# Patient Record
Sex: MALE | Race: BLACK OR AFRICAN AMERICAN | NOT HISPANIC OR LATINO | URBAN - METROPOLITAN AREA
[De-identification: names, ages, dates, MRNs, and addresses within clinical notes are randomized per-mention and may not be internally consistent; named-entity substitution may affect disease eponyms.]

---

## 2021-09-19 ENCOUNTER — EMERGENCY (EMERGENCY)
Facility: HOSPITAL | Age: 46
LOS: 0 days | Discharge: HOME | End: 2021-09-19
Attending: EMERGENCY MEDICINE
Payer: COMMERCIAL

## 2021-09-19 VITALS
HEART RATE: 88 BPM | OXYGEN SATURATION: 100 % | TEMPERATURE: 98 F | DIASTOLIC BLOOD PRESSURE: 73 MMHG | RESPIRATION RATE: 18 BRPM | SYSTOLIC BLOOD PRESSURE: 122 MMHG

## 2021-09-19 VITALS
OXYGEN SATURATION: 98 % | HEIGHT: 73 IN | SYSTOLIC BLOOD PRESSURE: 118 MMHG | DIASTOLIC BLOOD PRESSURE: 75 MMHG | WEIGHT: 246.04 LBS | TEMPERATURE: 98 F | HEART RATE: 94 BPM | RESPIRATION RATE: 19 BRPM

## 2021-09-19 DIAGNOSIS — H55.09 OTHER FORMS OF NYSTAGMUS: ICD-10-CM

## 2021-09-19 DIAGNOSIS — I10 ESSENTIAL (PRIMARY) HYPERTENSION: ICD-10-CM

## 2021-09-19 DIAGNOSIS — E11.9 TYPE 2 DIABETES MELLITUS WITHOUT COMPLICATIONS: ICD-10-CM

## 2021-09-19 DIAGNOSIS — R42 DIZZINESS AND GIDDINESS: ICD-10-CM

## 2021-09-19 LAB
BLOOD GAS SOURCE: SIGNIFICANT CHANGE UP
COHGB MFR BLDV: 1.3 % — SIGNIFICANT CHANGE UP (ref 0–2)
HGB BLD CALC-MCNC: 15.6 G/DL — SIGNIFICANT CHANGE UP (ref 13–17)

## 2021-09-19 PROCEDURE — 99285 EMERGENCY DEPT VISIT HI MDM: CPT

## 2021-09-19 PROCEDURE — 93010 ELECTROCARDIOGRAM REPORT: CPT

## 2021-09-19 PROCEDURE — 70450 CT HEAD/BRAIN W/O DYE: CPT | Mod: 26,MA

## 2021-09-19 RX ORDER — MECLIZINE HCL 12.5 MG
1 TABLET ORAL
Qty: 15 | Refills: 0
Start: 2021-09-19 | End: 2021-09-23

## 2021-09-19 RX ORDER — ACETAMINOPHEN 500 MG
975 TABLET ORAL ONCE
Refills: 0 | Status: COMPLETED | OUTPATIENT
Start: 2021-09-19 | End: 2021-09-19

## 2021-09-19 RX ORDER — MECLIZINE HCL 12.5 MG
50 TABLET ORAL ONCE
Refills: 0 | Status: COMPLETED | OUTPATIENT
Start: 2021-09-19 | End: 2021-09-19

## 2021-09-19 RX ADMIN — Medication 975 MILLIGRAM(S): at 22:18

## 2021-09-19 RX ADMIN — Medication 50 MILLIGRAM(S): at 19:42

## 2021-09-19 RX ADMIN — Medication 975 MILLIGRAM(S): at 23:20

## 2021-09-19 NOTE — ED PROVIDER NOTE - CARE PROVIDER_API CALL
Yasir Venegas)  Neurology  [address], [address]  [place] [address]  Phone: [phone]  Fax: [phone]  Follow Up Time:

## 2021-09-19 NOTE — ED PROVIDER NOTE - CLINICAL SUMMARY MEDICAL DECISION MAKING FREE TEXT BOX
DDx; BPPV, no evidence of central vertigo  Plan: CT head, meclozine, pt request carbon monoxide testing and reassess.

## 2021-09-19 NOTE — ED PROVIDER NOTE - NSFOLLOWUPINSTRUCTIONS_ED_ALL_ED_FT
1) Please follow-up with a neurologist about your symptoms.  If you cannot follow-up with your doctor(s), please return to the ED for any urgent issues.  2) If you have any worsening of symptoms or any other concerns please return to the ED immediately.  3) Please continue taking your home medications as directed.  4) You may have been given a copy of your labs and/or imaging.  Please go over these with your primary care doctor.   5) A prescription has been sent to your pharmacy. Please take it as directed.

## 2021-09-19 NOTE — ED ADULT NURSE NOTE - OBJECTIVE STATEMENT
Pt is a 45 yo M, AOx4 ambulatory hx of HTN, DM c/o dizziness and loss of balance x3 days. Pt denies any symptoms at this time. Allergy to shellfish.

## 2021-09-19 NOTE — ED PROVIDER NOTE - NEUROLOGICAL, MLM
Horizontal nystagmus to the right. Orange Hallpike positive. Epley improved symptoms. No dysmetria, normal finger to nose.

## 2021-09-19 NOTE — ED ADULT NURSE NOTE - SKIN TURGOR
COVID-19 PCR test completed. Patient handout For Patients Who Have Been Tested for Covid-19 (Coronavirus) was given to the patient, which includes test result notification process.    
resilient/elastic

## 2021-09-19 NOTE — ED PROVIDER NOTE - OBJECTIVE STATEMENT
Pt is a 47 yo male w/PMH of HTN and DM presents to the ED for evaluation of spinning sensation for the past x2 days. Sensation is worse w/ movement and better laying back. No headache, vomiting, ringing in ears, slurred speech, or focal weakness. No h/o of this in past.

## 2021-09-19 NOTE — ED PROVIDER NOTE - PATIENT PORTAL LINK FT
You can access the FollowMyHealth Patient Portal offered by [facility] by registering at the following website: [email or web address]. By joining [employer]’s FollowMyHealth portal, you will also be able to view your health information using other applications (apps) compatible with our system.

## 2021-12-27 ENCOUNTER — EMERGENCY (EMERGENCY)
Facility: HOSPITAL | Age: 46
LOS: 1 days | Discharge: HOME | End: 2021-12-27
Attending: EMERGENCY MEDICINE | Admitting: EMERGENCY MEDICINE
Payer: COMMERCIAL

## 2021-12-27 VITALS
RESPIRATION RATE: 18 BRPM | SYSTOLIC BLOOD PRESSURE: 135 MMHG | HEIGHT: 73 IN | DIASTOLIC BLOOD PRESSURE: 78 MMHG | OXYGEN SATURATION: 99 % | HEART RATE: 82 BPM | TEMPERATURE: 99 F

## 2021-12-27 VITALS
DIASTOLIC BLOOD PRESSURE: 65 MMHG | SYSTOLIC BLOOD PRESSURE: 116 MMHG | HEART RATE: 80 BPM | RESPIRATION RATE: 18 BRPM | TEMPERATURE: 99 F | OXYGEN SATURATION: 98 %

## 2021-12-27 LAB
ALBUMIN SERPL ELPH-MCNC: 4.4 G/DL — SIGNIFICANT CHANGE UP (ref 3.3–5)
ALP SERPL-CCNC: 48 U/L — SIGNIFICANT CHANGE UP (ref 40–120)
ALT FLD-CCNC: 36 U/L — SIGNIFICANT CHANGE UP (ref 4–41)
ANION GAP SERPL CALC-SCNC: 7 MMOL/L — SIGNIFICANT CHANGE UP (ref 7–14)
APPEARANCE UR: CLEAR — SIGNIFICANT CHANGE UP
AST SERPL-CCNC: 22 U/L — SIGNIFICANT CHANGE UP (ref 4–40)
BASOPHILS # BLD AUTO: 0.02 K/UL — SIGNIFICANT CHANGE UP (ref 0–0.2)
BASOPHILS NFR BLD AUTO: 0.2 % — SIGNIFICANT CHANGE UP (ref 0–2)
BILIRUB SERPL-MCNC: 0.9 MG/DL — SIGNIFICANT CHANGE UP (ref 0.2–1.2)
BILIRUB UR-MCNC: NEGATIVE — SIGNIFICANT CHANGE UP
BUN SERPL-MCNC: 15 MG/DL — SIGNIFICANT CHANGE UP (ref 7–23)
CALCIUM SERPL-MCNC: 9.3 MG/DL — SIGNIFICANT CHANGE UP (ref 8.4–10.5)
CHLORIDE SERPL-SCNC: 101 MMOL/L — SIGNIFICANT CHANGE UP (ref 98–107)
CO2 SERPL-SCNC: 29 MMOL/L — SIGNIFICANT CHANGE UP (ref 22–31)
COLOR SPEC: YELLOW — SIGNIFICANT CHANGE UP
CREAT SERPL-MCNC: 0.86 MG/DL — SIGNIFICANT CHANGE UP (ref 0.5–1.3)
DIFF PNL FLD: NEGATIVE — SIGNIFICANT CHANGE UP
EOSINOPHIL # BLD AUTO: 0.08 K/UL — SIGNIFICANT CHANGE UP (ref 0–0.5)
EOSINOPHIL NFR BLD AUTO: 1 % — SIGNIFICANT CHANGE UP (ref 0–6)
GLUCOSE SERPL-MCNC: 101 MG/DL — HIGH (ref 70–99)
GLUCOSE UR QL: NEGATIVE — SIGNIFICANT CHANGE UP
HCT VFR BLD CALC: 44.3 % — SIGNIFICANT CHANGE UP (ref 39–50)
HGB BLD-MCNC: 14.7 G/DL — SIGNIFICANT CHANGE UP (ref 13–17)
IANC: 4.23 K/UL — SIGNIFICANT CHANGE UP (ref 1.5–8.5)
IMM GRANULOCYTES NFR BLD AUTO: 0.2 % — SIGNIFICANT CHANGE UP (ref 0–1.5)
KETONES UR-MCNC: NEGATIVE — SIGNIFICANT CHANGE UP
LEUKOCYTE ESTERASE UR-ACNC: NEGATIVE — SIGNIFICANT CHANGE UP
LYMPHOCYTES # BLD AUTO: 3.39 K/UL — HIGH (ref 1–3.3)
LYMPHOCYTES # BLD AUTO: 40.4 % — SIGNIFICANT CHANGE UP (ref 13–44)
MCHC RBC-ENTMCNC: 29 PG — SIGNIFICANT CHANGE UP (ref 27–34)
MCHC RBC-ENTMCNC: 33.2 GM/DL — SIGNIFICANT CHANGE UP (ref 32–36)
MCV RBC AUTO: 87.4 FL — SIGNIFICANT CHANGE UP (ref 80–100)
MONOCYTES # BLD AUTO: 0.65 K/UL — SIGNIFICANT CHANGE UP (ref 0–0.9)
MONOCYTES NFR BLD AUTO: 7.7 % — SIGNIFICANT CHANGE UP (ref 2–14)
NEUTROPHILS # BLD AUTO: 4.23 K/UL — SIGNIFICANT CHANGE UP (ref 1.8–7.4)
NEUTROPHILS NFR BLD AUTO: 50.5 % — SIGNIFICANT CHANGE UP (ref 43–77)
NITRITE UR-MCNC: NEGATIVE — SIGNIFICANT CHANGE UP
NRBC # BLD: 0 /100 WBCS — SIGNIFICANT CHANGE UP
NRBC # FLD: 0 K/UL — SIGNIFICANT CHANGE UP
PH UR: 6 — SIGNIFICANT CHANGE UP (ref 5–8)
PLATELET # BLD AUTO: 236 K/UL — SIGNIFICANT CHANGE UP (ref 150–400)
POTASSIUM SERPL-MCNC: 4.1 MMOL/L — SIGNIFICANT CHANGE UP (ref 3.5–5.3)
POTASSIUM SERPL-SCNC: 4.1 MMOL/L — SIGNIFICANT CHANGE UP (ref 3.5–5.3)
PROT SERPL-MCNC: 7.8 G/DL — SIGNIFICANT CHANGE UP (ref 6–8.3)
PROT UR-MCNC: ABNORMAL
RBC # BLD: 5.07 M/UL — SIGNIFICANT CHANGE UP (ref 4.2–5.8)
RBC # FLD: 11.9 % — SIGNIFICANT CHANGE UP (ref 10.3–14.5)
SODIUM SERPL-SCNC: 137 MMOL/L — SIGNIFICANT CHANGE UP (ref 135–145)
SP GR SPEC: 1.03 — SIGNIFICANT CHANGE UP (ref 1–1.05)
UROBILINOGEN FLD QL: ABNORMAL
WBC # BLD: 8.39 K/UL — SIGNIFICANT CHANGE UP (ref 3.8–10.5)
WBC # FLD AUTO: 8.39 K/UL — SIGNIFICANT CHANGE UP (ref 3.8–10.5)

## 2021-12-27 PROCEDURE — 76770 US EXAM ABDO BACK WALL COMP: CPT | Mod: 26

## 2021-12-27 PROCEDURE — 99285 EMERGENCY DEPT VISIT HI MDM: CPT

## 2021-12-27 RX ORDER — KETOROLAC TROMETHAMINE 30 MG/ML
15 SYRINGE (ML) INJECTION ONCE
Refills: 0 | Status: DISCONTINUED | OUTPATIENT
Start: 2021-12-27 | End: 2021-12-27

## 2021-12-27 RX ADMIN — Medication 15 MILLIGRAM(S): at 21:07

## 2021-12-27 NOTE — ED ADULT NURSE NOTE - OBJECTIVE STATEMENT
received pt in intake room 3, 47 yr/o male A+Ox4 ambulatory at baseline. PMH of DM (metformin) and HTN who p/w right flank pain x 3 days, foul smelling and dark urine.  Also with chills, nasal congestion, sore throat and mild cough all starting today. VSS, denies chest pain and SOB, RR even and unlabored. no travel, no known sick contacts. denies abdominal pain N/V/D/C. right AC 20g placed, labs drawn and sent. will continue to monitor.

## 2021-12-27 NOTE — ED PROVIDER NOTE - PATIENT PORTAL LINK FT
You can access the FollowMyHealth Patient Portal offered by [facility] by registering at the following website: [email or web address]. By joining [employer]’s FollowMyHealth portal, you will also be able to view your health information using other applications (apps) compatible with our system.

## 2021-12-27 NOTE — ED PROVIDER NOTE - OBJECTIVE STATEMENT
46 yr old M c ho DM (metformin) and HTN who p/w right flank pain x 3 days, foul smelling and dark urine.  Also with chills, nasal congestion, sore throat and mild cough all starting today. NO travel, No known sick contacts.  Has not been working lately d/t suspension. Lives alone. No dysuria. Flank pain is localized, constant.  No other MSK pain.

## 2021-12-27 NOTE — ED ADULT NURSE NOTE - CHIEF COMPLAINT QUOTE
Pt c/o R sided flank pain, headache, congestion, subjective fevers x 3 days. Also reports foul-smelling urine. Reports taking Ibuprofen prior to arrival. Denies nausea/vomiting, dysuria. PMHx DM, HTN

## 2021-12-27 NOTE — ED PROVIDER NOTE - NSFOLLOWUPINSTRUCTIONS_ED_ALL_ED_FT
Your symptoms are probably being caused by a viral syndrome.  This is most likely COVID-19 (coronavirus).  Please take Tylenol 650 mg every 6 hours as needed for fever or pain and make sure to drink plenty of fluids and to eat as tolerated. You may also take Ibuprofen 400 mg every 6 hours if needed. It is ok to eat less than usual as long as you are drinking.  Your COVID result will be emailed to you, and you may access it on the Patient Portal (instructions included in this packet).     Return to the ER for worsening shortness of breath, uncontrolled fevers, or uncontrolled pain.     Please follow the self quarantine instructions in the packet provided.  You should wear a mask when around other people and and wash your hands frequently.  If you live with others, they should also wear masks when they are around you.  Clean commonly touched surfaces (doorknobs, cabinets, etc) frequently with disinfectant .  Make every effort to avoid people with weakened immune systems since they can get very sick from the Coronavirus.     Call your primary care doctor for follow up in 4-5 days, or earlier if needed.

## 2021-12-27 NOTE — ED PROVIDER NOTE - CLINICAL SUMMARY MEDICAL DECISION MAKING FREE TEXT BOX
Middle aged M c DM and HTN who p/w 3 days of flank pain, also dark urine.  No other urinary sx though. Also with sore throat and mild cough starting today.  Suspect viral syndrome.  No CVAT but will assess for UTI/pyelo, r/o hydronephrosis.  Swab for COVID.

## 2021-12-27 NOTE — ED ADULT TRIAGE NOTE - CHIEF COMPLAINT QUOTE
Pt c/o R sided flank pain, headache, congestion, subjective fevers x 3 days. Also reports foul-smelling urine. Reports taking Ibuprofen prior to arrival. Denies nausea/vomiting. PMHx DM, HTN Pt c/o R sided flank pain, headache, congestion, subjective fevers x 3 days. Also reports foul-smelling urine. Reports taking Ibuprofen prior to arrival. Denies nausea/vomiting, dysuria. PMHx DM, HTN

## 2021-12-28 PROBLEM — E11.9 TYPE 2 DIABETES MELLITUS WITHOUT COMPLICATIONS: Chronic | Status: ACTIVE | Noted: 2021-09-22

## 2021-12-28 PROBLEM — I10 ESSENTIAL (PRIMARY) HYPERTENSION: Chronic | Status: ACTIVE | Noted: 2021-09-22

## 2021-12-28 LAB — SARS-COV-2 RNA SPEC QL NAA+PROBE: DETECTED

## 2021-12-29 LAB
CULTURE RESULTS: SIGNIFICANT CHANGE UP
SPECIMEN SOURCE: SIGNIFICANT CHANGE UP

## 2022-04-11 NOTE — ED ADULT TRIAGE NOTE - BEFAST SCREENING
Relevant Problems   RESPIRATORY SYSTEM   (+) NADEEM (obstructive sleep apnea)      NEUROLOGY   (+) Migraine      CARDIOVASCULAR   (+) Essential hypertension       Anesthetic History   No history of anesthetic complications            Review of Systems / Medical History  Patient summary reviewed and pertinent labs reviewed    Pulmonary        Sleep apnea: CPAP           Neuro/Psych         Headaches (migraines)     Cardiovascular    Hypertension: well controlled              Exercise tolerance: >4 METS     GI/Hepatic/Renal     GERD: well controlled           Endo/Other        Morbid obesity and anemia (Hb 10.5)     Other Findings              Physical Exam    Airway  Mallampati: III  TM Distance: 4 - 6 cm  Neck ROM: normal range of motion   Mouth opening: Normal     Cardiovascular  Regular rate and rhythm,  S1 and S2 normal,  no murmur, click, rub, or gallop             Dental         Pulmonary  Breath sounds clear to auscultation               Abdominal         Other Findings            Anesthetic Plan    ASA: 3  Anesthesia type: general          Induction: Intravenous  Anesthetic plan and risks discussed with: Patient and Spouse Negative

## 2022-07-25 ENCOUNTER — EMERGENCY (EMERGENCY)
Facility: HOSPITAL | Age: 47
LOS: 0 days | Discharge: HOME | End: 2022-07-26
Attending: EMERGENCY MEDICINE

## 2022-07-25 VITALS
DIASTOLIC BLOOD PRESSURE: 78 MMHG | RESPIRATION RATE: 17 BRPM | WEIGHT: 220.02 LBS | SYSTOLIC BLOOD PRESSURE: 120 MMHG | HEART RATE: 79 BPM | HEIGHT: 73 IN | OXYGEN SATURATION: 100 % | TEMPERATURE: 98 F

## 2022-07-25 DIAGNOSIS — Z79.84 LONG TERM (CURRENT) USE OF ORAL HYPOGLYCEMIC DRUGS: ICD-10-CM

## 2022-07-25 DIAGNOSIS — R56.9 UNSPECIFIED CONVULSIONS: ICD-10-CM

## 2022-07-25 DIAGNOSIS — I10 ESSENTIAL (PRIMARY) HYPERTENSION: ICD-10-CM

## 2022-07-25 DIAGNOSIS — R41.0 DISORIENTATION, UNSPECIFIED: ICD-10-CM

## 2022-07-25 DIAGNOSIS — Z91.013 ALLERGY TO SEAFOOD: ICD-10-CM

## 2022-07-25 DIAGNOSIS — R74.02 ELEVATION OF LEVELS OF LACTIC ACID DEHYDROGENASE [LDH]: ICD-10-CM

## 2022-07-25 DIAGNOSIS — E11.9 TYPE 2 DIABETES MELLITUS WITHOUT COMPLICATIONS: ICD-10-CM

## 2022-07-25 LAB — GLUCOSE BLDC GLUCOMTR-MCNC: 126 MG/DL — HIGH (ref 70–99)

## 2022-07-25 PROCEDURE — 93010 ELECTROCARDIOGRAM REPORT: CPT

## 2022-07-25 PROCEDURE — 99053 MED SERV 10PM-8AM 24 HR FAC: CPT

## 2022-07-25 PROCEDURE — 99285 EMERGENCY DEPT VISIT HI MDM: CPT

## 2022-07-25 RX ORDER — SODIUM CHLORIDE 9 MG/ML
1000 INJECTION INTRAMUSCULAR; INTRAVENOUS; SUBCUTANEOUS ONCE
Refills: 0 | Status: COMPLETED | OUTPATIENT
Start: 2022-07-25 | End: 2022-07-25

## 2022-07-25 NOTE — ED PROVIDER NOTE - OBJECTIVE STATEMENT
46 y/o M with PMHx of HTN and DM presents to the ED for seizures. Pt's family reported that pt had LOC. Pt drank some vodka but he doesn't drink every day. Pt has no hx of withdrawal. Apparently, he called his family that he doesn't feel good and when his cousin arrived he found pt had a blank stare. EMS was called and in rout to the hospital, pt had similar episodes. No seizure activity is noted. Pt is confused as he was before. Pt denies CP, abd pain, hx of seizures, drug use. Pt wishes to leave at this point.

## 2022-07-25 NOTE — ED PROVIDER NOTE - PATIENT PORTAL LINK FT
You can access the FollowMyHealth Patient Portal offered by [facility] by registering at the following website: [email or web address]. By joining "[employer]"’s FollowMyHealth portal, you will also be able to view your health information using other applications (apps) compatible with our system.

## 2022-07-25 NOTE — ED PROVIDER NOTE - PROGRESS NOTE DETAILS
Pt found to have elevated lactate, possibly consistent with new seizure. CT head unremarkable, troponin negative. Pt declines to stay for repeat lactate, and patient is with wife and who is willing to care for him. Return precautions provided.

## 2022-07-25 NOTE — ED PROVIDER NOTE - CLINICAL SUMMARY MEDICAL DECISION MAKING FREE TEXT BOX
DDx: alcohol intoxication/syncope/presentation not typical for seizures, no tongue bite, no loss of urinary contents.    Plan: CT head, CBC, CMP, troponin, lactate, CXR, alcohol levels, reassess.

## 2022-07-25 NOTE — ED PROVIDER NOTE - CPE EDP MUSC NORM
normal... Area L Indication Text: Tumors in this location are included in Area L (trunk and extremities).  Mohs surgery is indicated for larger tumors, or tumors with aggressive histologic features, in these anatomic locations.

## 2022-07-26 LAB
ALBUMIN SERPL ELPH-MCNC: 3.3 G/DL — SIGNIFICANT CHANGE UP (ref 3.3–5)
ALP SERPL-CCNC: 50 U/L — SIGNIFICANT CHANGE UP (ref 40–120)
ALT FLD-CCNC: 49 U/L — SIGNIFICANT CHANGE UP (ref 12–78)
AMPHET UR-MCNC: NEGATIVE — SIGNIFICANT CHANGE UP
ANION GAP SERPL CALC-SCNC: 8 MMOL/L — SIGNIFICANT CHANGE UP (ref 5–17)
APPEARANCE UR: CLEAR — SIGNIFICANT CHANGE UP
AST SERPL-CCNC: 31 U/L — SIGNIFICANT CHANGE UP (ref 15–37)
BARBITURATES UR SCN-MCNC: NEGATIVE — SIGNIFICANT CHANGE UP
BASOPHILS # BLD AUTO: 0.03 K/UL — SIGNIFICANT CHANGE UP (ref 0–0.2)
BASOPHILS NFR BLD AUTO: 0.3 % — SIGNIFICANT CHANGE UP (ref 0–2)
BENZODIAZ UR-MCNC: NEGATIVE — SIGNIFICANT CHANGE UP
BILIRUB SERPL-MCNC: 0.4 MG/DL — SIGNIFICANT CHANGE UP (ref 0.2–1.2)
BILIRUB UR-MCNC: NEGATIVE — SIGNIFICANT CHANGE UP
BUN SERPL-MCNC: 15 MG/DL — SIGNIFICANT CHANGE UP (ref 7–23)
CALCIUM SERPL-MCNC: 8.7 MG/DL — SIGNIFICANT CHANGE UP (ref 8.5–10.1)
CHLORIDE SERPL-SCNC: 108 MMOL/L — SIGNIFICANT CHANGE UP (ref 96–108)
CO2 SERPL-SCNC: 24 MMOL/L — SIGNIFICANT CHANGE UP (ref 22–31)
COCAINE METAB.OTHER UR-MCNC: NEGATIVE — SIGNIFICANT CHANGE UP
COLOR SPEC: YELLOW — SIGNIFICANT CHANGE UP
CREAT SERPL-MCNC: 0.86 MG/DL — SIGNIFICANT CHANGE UP (ref 0.5–1.3)
DIFF PNL FLD: NEGATIVE — SIGNIFICANT CHANGE UP
EGFR: 107 ML/MIN/1.73M2 — SIGNIFICANT CHANGE UP
EOSINOPHIL # BLD AUTO: 0.08 K/UL — SIGNIFICANT CHANGE UP (ref 0–0.5)
EOSINOPHIL NFR BLD AUTO: 0.7 % — SIGNIFICANT CHANGE UP (ref 0–6)
ETHANOL SERPL-MCNC: 165 MG/DL — HIGH (ref 0–10)
GLUCOSE SERPL-MCNC: 120 MG/DL — HIGH (ref 70–99)
GLUCOSE UR QL: NEGATIVE MG/DL — SIGNIFICANT CHANGE UP
HCT VFR BLD CALC: 41.2 % — SIGNIFICANT CHANGE UP (ref 39–50)
HGB BLD-MCNC: 13.9 G/DL — SIGNIFICANT CHANGE UP (ref 13–17)
IMM GRANULOCYTES NFR BLD AUTO: 0.4 % — SIGNIFICANT CHANGE UP (ref 0–1.5)
KETONES UR-MCNC: NEGATIVE — SIGNIFICANT CHANGE UP
LACTATE SERPL-SCNC: 2.6 MMOL/L — HIGH (ref 0.7–2)
LEUKOCYTE ESTERASE UR-ACNC: NEGATIVE — SIGNIFICANT CHANGE UP
LYMPHOCYTES # BLD AUTO: 2.76 K/UL — SIGNIFICANT CHANGE UP (ref 1–3.3)
LYMPHOCYTES # BLD AUTO: 24.3 % — SIGNIFICANT CHANGE UP (ref 13–44)
MCHC RBC-ENTMCNC: 29.8 PG — SIGNIFICANT CHANGE UP (ref 27–34)
MCHC RBC-ENTMCNC: 33.7 G/DL — SIGNIFICANT CHANGE UP (ref 32–36)
MCV RBC AUTO: 88.2 FL — SIGNIFICANT CHANGE UP (ref 80–100)
METHADONE UR-MCNC: NEGATIVE — SIGNIFICANT CHANGE UP
MONOCYTES # BLD AUTO: 0.71 K/UL — SIGNIFICANT CHANGE UP (ref 0–0.9)
MONOCYTES NFR BLD AUTO: 6.3 % — SIGNIFICANT CHANGE UP (ref 2–14)
NEUTROPHILS # BLD AUTO: 7.72 K/UL — HIGH (ref 1.8–7.4)
NEUTROPHILS NFR BLD AUTO: 68 % — SIGNIFICANT CHANGE UP (ref 43–77)
NITRITE UR-MCNC: NEGATIVE — SIGNIFICANT CHANGE UP
NRBC # BLD: 0 /100 WBCS — SIGNIFICANT CHANGE UP (ref 0–0)
NT-PROBNP SERPL-SCNC: 16 PG/ML — SIGNIFICANT CHANGE UP (ref 0–125)
OPIATES UR-MCNC: NEGATIVE — SIGNIFICANT CHANGE UP
PCP SPEC-MCNC: SIGNIFICANT CHANGE UP
PCP UR-MCNC: NEGATIVE — SIGNIFICANT CHANGE UP
PH UR: 5 — SIGNIFICANT CHANGE UP (ref 5–8)
PLATELET # BLD AUTO: 269 K/UL — SIGNIFICANT CHANGE UP (ref 150–400)
POTASSIUM SERPL-MCNC: 4.3 MMOL/L — SIGNIFICANT CHANGE UP (ref 3.5–5.3)
POTASSIUM SERPL-SCNC: 4.3 MMOL/L — SIGNIFICANT CHANGE UP (ref 3.5–5.3)
PROT SERPL-MCNC: 7.5 GM/DL — SIGNIFICANT CHANGE UP (ref 6–8.3)
PROT UR-MCNC: NEGATIVE MG/DL — SIGNIFICANT CHANGE UP
RBC # BLD: 4.67 M/UL — SIGNIFICANT CHANGE UP (ref 4.2–5.8)
RBC # FLD: 12.2 % — SIGNIFICANT CHANGE UP (ref 10.3–14.5)
SODIUM SERPL-SCNC: 140 MMOL/L — SIGNIFICANT CHANGE UP (ref 135–145)
SP GR SPEC: 1.02 — SIGNIFICANT CHANGE UP (ref 1.01–1.02)
THC UR QL: NEGATIVE — SIGNIFICANT CHANGE UP
TROPONIN I, HIGH SENSITIVITY RESULT: 5.3 NG/L — SIGNIFICANT CHANGE UP
UROBILINOGEN FLD QL: NEGATIVE MG/DL — SIGNIFICANT CHANGE UP
WBC # BLD: 11.34 K/UL — HIGH (ref 3.8–10.5)
WBC # FLD AUTO: 11.34 K/UL — HIGH (ref 3.8–10.5)

## 2022-07-26 PROCEDURE — 70450 CT HEAD/BRAIN W/O DYE: CPT | Mod: 26,MA

## 2022-07-26 RX ORDER — SODIUM CHLORIDE 9 MG/ML
1000 INJECTION INTRAMUSCULAR; INTRAVENOUS; SUBCUTANEOUS ONCE
Refills: 0 | Status: COMPLETED | OUTPATIENT
Start: 2022-07-26 | End: 2022-07-26

## 2022-07-26 RX ORDER — METFORMIN HYDROCHLORIDE 850 MG/1
0 TABLET ORAL
Qty: 0 | Refills: 0 | DISCHARGE

## 2022-07-26 RX ORDER — LISINOPRIL 2.5 MG/1
1 TABLET ORAL
Qty: 0 | Refills: 0 | DISCHARGE

## 2022-07-26 RX ADMIN — SODIUM CHLORIDE 1000 MILLILITER(S): 9 INJECTION INTRAMUSCULAR; INTRAVENOUS; SUBCUTANEOUS at 01:34

## 2022-07-26 RX ADMIN — SODIUM CHLORIDE 1000 MILLILITER(S): 9 INJECTION INTRAMUSCULAR; INTRAVENOUS; SUBCUTANEOUS at 00:04

## 2022-07-26 NOTE — ED ADULT NURSE NOTE - OBJECTIVE STATEMENT
Patient A& o x3 BIBA for seizures.  Pt says he drank today- about 3 cups of vodka. Pt says he called his family that he didn't feel well. Pt's cousins said he had a blank stare and EMS said patient had a similar episode on the way to the hospital. PT is awake and laughing and wants to leave. Pt denies cp, no sob. pt has hx of dm, htn.

## 2022-07-26 NOTE — ED ADULT NURSE NOTE - NSIMPLEMENTINTERV_GEN_ALL_ED
Implemented All Fall Risk Interventions:  [place] to call system. Call bell, personal items and telephone within reach. Instruct patient to call for assistance. Room bathroom lighting operational. Non-slip footwear when patient is off stretcher. Physically safe environment: no spills, clutter or unnecessary equipment. Stretcher in lowest position, wheels locked, appropriate side rails in place. Provide visual cue, wrist band, yellow gown, etc. Monitor gait and stability. Monitor for mental status changes and reorient to person, place, and time. Review medications for side effects contributing to fall risk. Reinforce activity limits and safety measures with patient and family.

## 2022-07-26 NOTE — ED ADULT NURSE REASSESSMENT NOTE - NS ED NURSE REASSESS COMMENT FT1
Patient refused repeat lactate. Patient left without discharge papers or repeat vitals. IV removed. Dr Scout shah. Patient A& o x3 refused repeat lactate. Patient left without discharge papers or repeat vitals. IV removed. Dr Scout shah.

## 2022-07-27 LAB
CULTURE RESULTS: SIGNIFICANT CHANGE UP
SPECIMEN SOURCE: SIGNIFICANT CHANGE UP

## 2024-05-15 NOTE — ED ADULT TRIAGE NOTE - NSTRIAGECARE_GEN_A_ER
AMBULATORY CASE MANAGEMENT NOTE    Names and Relationships of Patient/Support Persons: Caller: Mary Albarran; Relationship: Self -     Patient Outreach  Called and spoke to patient, provided RN role and basis of CCM program. Patient stated she is unsure if she wants to be part of program, is driving home and agreeable to be called again later this week to discuss.     Patient had recent hospitalization to [facility] for nausea and vomiting, anemia and acute on chronic CHF from May 6-8.     RN will follow up.     Care Coordination  Notified TCM of patient dc on May 8 from [facility]. Per Carmela, notification should take place within 2 days of patient discharge to be called by TCM team. Patient outside of 2 day window.    RN discussed current status with Brittni. She is unsure if patient will be interested in program at this time, RN educated on TCM calls. Brittni verbalized agreement.      Education Documentation  No documentation found.        Safia KYLE  Ambulatory Case Management    5/15/2024, 12:21 EDT  
Patient has history of pauses occurring during sleep hours, episode likely occurring 0531 given time change. Will continue to monitor.       
Face Mask

## 2024-06-26 NOTE — ED PROVIDER NOTE - CROS ED NEURO POS
HPI:     37y G_P_  Last Menstrual Period   presents with   No CP, no SOB no difficulty breathing  Bleeding started at 8 AM  Took vaginal cyto at 9 PM last night  Last ate cereal at 10 AM    PMHX: none, no asthma, no HTN  PSHX; plates in foot, C/S x 4, D and C x 3  POBHX; c/section x 4 no complications postpartum  endometritis x 1 resolved wtih po abx, SAB, TOP x 3 (mTOP x 1, D and C x 2)  PGYNHX: hx of abnl pap, normal colpos, no fibroids, no cysts, no STDs  SOCIAL: smokes cigarettes a couple a day, no drinking, no illicit drugs.  Allergies    No Known Allergies    Intolerances      MEDS:      Vital Signs Last 24 Hrs  T(C): 37.3 (26 Jun 2024 12:28), Max: 37.3 (26 Jun 2024 12:28)  T(F): 99.1 (26 Jun 2024 12:28), Max: 99.1 (26 Jun 2024 12:28)  HR: 115 (26 Jun 2024 12:28) (115 - 115)  BP: 98/65 (26 Jun 2024 12:28) (98/65 - 98/65)  BP(mean): 76 (26 Jun 2024 12:28) (76 - 76)  RR: 18 (26 Jun 2024 12:28) (18 - 18)  SpO2: 99% (26 Jun 2024 12:28) (99% - 99%)    Parameters below as of 26 Jun 2024 12:28  Patient On (Oxygen Delivery Method): room air         PHYSICAL EXAM:  CHEST/LUNG: normal effort, ambulating without effort    ABDOMEN: Soft, Nontender, Nondistended; Bowel sounds present  EXTREMITIES:  2+ Peripheral Pulses, No clubbing, cyanosis, or edema  PELVIC:        EXTERNAL GENITALIA: Normal. No rashes or lesions noted.         VAGINA: healthy pink mucosa, no gross lesions, no discharge noted, no blood noted.          CERVIX: no lesions. closed, no active bleeding through os. no CMt noted.        UTERUS: normal size, nontender, mobile        ADNEXA: no adnexal masses or tenderness appreciated.    LABS:                RADIOLOGY STUDIES:     HPI:     37y   Last Menstrual Period 24 presents from the [facility] office for evaluation of heavy vaginal bleeding. She took cytotec vaginally last night and then started bleeding at around 8am this morning. She is passing clots and having BRB from the vagina.  No CP, no SOB no difficulty breathing. No dizziness   Last ate cereal at 10 AM    PMHX: none, no asthma, no HTN  PSHX; plates in foot, C/S x 4, D and C x 3, tendon sx  POBHX; c/section x 4 no complications. postpartum  endometritis x 1 resolved wtih po abx, SAB managed with D&C, TOP x 3 (mTOP x 1, D&C x 2)  PGYNHX: hx of abnl pap, normal colpos, no fibroids, no cysts, no STDs  SOCIAL: smokes cigarettes a couple a day, no drinking, no illicit drugs.  Allergies  No Known Allergies  MEDS: denies       Vital Signs Last 24 Hrs  T(C): 37.3 (2024 12:28), Max: 37.3 (2024 12:28)  T(F): 99.1 (2024 12:28), Max: 99.1 (2024 12:28)  HR: 115 (2024 12:28) (115 - 115)  BP: 98/65 (2024 12:28) (98/65 - 98/65)  BP(mean): 76 (2024 12:28) (76 - 76)  RR: 18 (2024 12:28) (18 - 18)  SpO2: 99% (2024 12:28) (99% - 99%)    Parameters below as of 2024 12:28  Patient On (Oxygen Delivery Method): room air         PHYSICAL EXAM:  GEN: patient appears well  CHEST/LUNG: normal effort, ambulating without effort  ABDOMEN: Soft, Nontender, Nondistended  EXTREMITIES:  2+ Peripheral Pulses, No clubbing, cyanosis, or edema  PELVIC:        EXTERNAL GENITALIA: Normal. No rashes or lesions noted.         VAGINA: healthy pink mucosa, no gross lesions, no discharge noted, significant clots noted in the vagina         CERVIX: no lesions. closed, small amount of active bleeding through os. no CMT noted.        UTERUS: normal size, ntender, mobile        ADNEXA: no adnexal masses or tenderness appreciated.    LABS:                          11.8   12.30 )-----------( 272      ( 2024 12:55 )             35.8         136  |  109<H>  |  8   ----------------------------<  110<H>  4.3   |  23  |  0.65    Ca    9.1      2024 12:55    TPro  7.3  /  Alb  3.8  /  TBili  0.6  /  DBili  x   /  AST  13<L>  /  ALT  14  /  AlkPhos  43        RADIOLOGY STUDIES:     HPI:     37y   Last Menstrual Period 24 presents [facility] the [facility] office for evaluation of heavy vaginal bleeding. She took cytotec vaginally last night and then started bleeding at around 8am this morning. She is passing clots and having BRB from the vagina.  No CP, no SOB no difficulty breathing. No dizziness   Last ate cereal at 10 AM    PMHX: none, no asthma, no HTN  PSHX; plates in foot, C/S x 4, D and C x 3, tendon sx  POBHX; c/section x 4 no complications. postpartum  endometritis x 1 resolved wtih po abx, SAB managed with D&C, TOP x 3 (mTOP x 1, D&C x 2)  PGYNHX: hx of abnl pap, normal colpos, no fibroids, no cysts, no STDs  SOCIAL: smokes cigarettes a couple a day, no drinking, no illicit drugs.  Allergies  No Known Allergies  MEDS: denies       Vital Signs Last 24 Hrs  T(C): 37.3 (2024 12:28), Max: 37.3 (2024 12:28)  T(F): 99.1 (2024 12:28), Max: 99.1 (2024 12:28)  HR: 115 (2024 12:28) (115 - 115)  BP: 98/65 (2024 12:28) (98/65 - 98/65)  BP(mean): 76 (2024 12:28) (76 - 76)  RR: 18 (2024 12:28) (18 - 18)  SpO2: 99% (2024 12:28) (99% - 99%)    Parameters below as of 2024 12:28  Patient On (Oxygen Delivery Method): room air    Blood type RH positive.       PHYSICAL EXAM:  GEN: patient appears well  CHEST/LUNG: normal effort, ambulating without effort  ABDOMEN: Soft, Nontender, Nondistended  PELVIC:        EXTERNAL GENITALIA: Normal. No rashes or lesions noted.         VAGINA: healthy pink mucosa, no gross lesions, no discharge noted, significant clots noted in the vagina about 100 cc         CERVIX: no lesions. closed, small amount of active bleeding through os. no CMT noted.        UTERUS: normal size, ntender, mobile        ADNEXA: no adnexal masses or tenderness appreciated.    LABS:                          11.8   12.30 )-----------( 272      ( 2024 12:55 )             35.8         136  |  109<H>  |  8   ----------------------------<  110<H>  4.3   |  23  |  0.65    Ca    9.1      2024 12:55    TPro  7.3  /  Alb  3.8  /  TBili  0.6  /  DBili  x   /  AST  13<L>  /  ALT  14  /  AlkPhos  43      RH positive      RADIOLOGY STUDIES:  < from: US Transvaginal, OB (24 @ 14:07) >  PROCEDURE DATE:  2024          INTERPRETATION:  CLINICAL INFORMATION: Heavy vaginal bleeding. SAB at 10   weeks status post misoprostol administration yesterday.    LMP: 2024    Estimated Gestational Age by LMP: 10 weeks 5 days    COMPARISON: None available.    Endovaginal and transabdominal pelvic sonogram.    FINDINGS:  Uterus: 10 x 5.9 x 6.1 cm. Evidence of an irregular complex cystic focus   in the lower uterus and a simple appearing fluid collection in the upper   uterus. The findings are consistent with retained conceptus.    Gestational Sac Size (mean): 11.2 mm  Gestational Sac Shape : Irregular  Crown Rump Length: No fetal pole.      Right ovary: 1.4 cm x 2.1 cm x 2.2 cm. Within normal limits.  Left ovary: 1.6 cm x 1.4 cm x 1.3 cm. Within normal limits.    Fluid: None.    IMPRESSION:    Complex and simple fluid collections within the uterus concerning for   retained conceptus.      --- End of Report ---            ROXANNA MONTGOMERY MD; Attending Radiologist  This document has been electronically signed. 2024  2:47PM    < end of copied text >     HPI:     37y   Last Menstrual Period 24 presents from the [facility] office for evaluation of heavy vaginal bleeding. She took cytotec vaginally last night and then started bleeding at around 8am this morning. She is passing clots and having BRB from the vagina. Pt denies any pelvic cramping or pain.  No CP, no SOB no difficulty breathing. No dizziness   Last ate cereal at 10 AM    PMHX: none, no asthma, no HTN  PSHX; plates in foot, C/S x 4, D and C x 3, tendon sx  POBHX; c/section x 4 no complications. postpartum  endometritis x 1 resolved wtih po abx, SAB managed with D&C, TOP x 3 (mTOP x 1, D&C x 2)  PGYNHX: hx of abnl pap, normal colpos, no fibroids, no cysts, no STDs  SOCIAL: smokes cigarettes a couple a day, no drinking, no illicit drugs.  Allergies  No Known Allergies  MEDS: denies       Vital Signs Last 24 Hrs  T(C): 37.3 (2024 12:28), Max: 37.3 (2024 12:28)  T(F): 99.1 (2024 12:28), Max: 99.1 (2024 12:28)  HR: 115 (2024 12:28) (115 - 115)  BP: 98/65 (2024 12:28) (98/65 - 98/65)  BP(mean): 76 (2024 12:28) (76 - 76)  RR: 18 (2024 12:28) (18 - 18)  SpO2: 99% (2024 12:28) (99% - 99%)    Parameters below as of 2024 12:28  Patient On (Oxygen Delivery Method): room air    Blood type RH positive.       PHYSICAL EXAM:  GEN: patient appears well  CHEST/LUNG: normal effort, ambulating without effort  ABDOMEN: Soft, Nontender, Nondistended  PELVIC:        EXTERNAL GENITALIA: Normal. No rashes or lesions noted.         VAGINA: healthy pink mucosa, no gross lesions, no discharge noted, significant clots noted in the vagina about 100 cc         CERVIX: no lesions. closed, small amount of active bleeding through os. no CMT noted.        UTERUS: normal size, nontender, mobile        ADNEXA: no adnexal masses or tenderness appreciated.    LABS:                          11.8   12.30 )-----------( 272      ( 2024 12:55 )             35.8         136  |  109<H>  |  8   ----------------------------<  110<H>  4.3   |  23  |  0.65    Ca    9.1      2024 12:55    TPro  7.3  /  Alb  3.8  /  TBili  0.6  /  DBili  x   /  AST  13<L>  /  ALT  14  /  AlkPhos  43      RH positive      RADIOLOGY STUDIES:  < from: US Transvaginal, OB (24 @ 14:07) >  PROCEDURE DATE:  2024          INTERPRETATION:  CLINICAL INFORMATION: Heavy vaginal bleeding. SAB at 10   weeks status post misoprostol administration yesterday.    LMP: 2024    Estimated Gestational Age by LMP: 10 weeks 5 days    COMPARISON: None available.    Endovaginal and transabdominal pelvic sonogram.    FINDINGS:  Uterus: 10 x 5.9 x 6.1 cm. Evidence of an irregular complex cystic focus   in the lower uterus and a simple appearing fluid collection in the upper   uterus. The findings are consistent with retained conceptus.    Gestational Sac Size (mean): 11.2 mm  Gestational Sac Shape : Irregular  Crown Rump Length: No fetal pole.      Right ovary: 1.4 cm x 2.1 cm x 2.2 cm. Within normal limits.  Left ovary: 1.6 cm x 1.4 cm x 1.3 cm. Within normal limits.    Fluid: None.    IMPRESSION:    Complex and simple fluid collections within the uterus concerning for   retained conceptus.      --- End of Report ---            ROXANNA MONTGOMERY MD; Attending Radiologist  This document has been electronically signed. 2024  2:47PM    < end of copied text >   SEIZURES

## 2024-11-15 NOTE — ED ADULT TRIAGE NOTE - WEIGHT IN LBS
220
Detail Level: Detailed
General Sunscreen Counseling: I recommended a broad spectrum sunscreen with a SPF of 30 or higher.  I explained that SPF 30 sunscreens block approximately 97 percent of the sun's harmful rays.  Sunscreens should be applied at least 15 minutes prior to expected sun exposure and then every 2 hours after that as long as sun exposure continues. If swimming or exercising sunscreen should be reapplied every 45 minutes to an hour after getting wet or sweating.  One ounce, or the equivalent of a shot glass full of sunscreen, is adequate to protect the skin not covered by a bathing suit. I also recommended a lip balm with a sunscreen as well. Sun protective clothing can be used in lieu of sunscreen but must be worn the entire time you are exposed to the sun's rays.

## 2025-01-14 ENCOUNTER — APPOINTMENT (OUTPATIENT)
Dept: UROLOGY | Facility: CLINIC | Age: 50
End: 2025-01-14

## 2025-04-22 NOTE — ED PROVIDER NOTE - DISPOSITION TYPE
"Daily Note     Today's date: 2025  Patient name: Osmin Hook  : 1966  MRN: [id number]  Referring provider: Kirsten Rodrigues PA-C  Dx:   Encounter Diagnosis     ICD-10-CM    1. S/P arthroscopy of right shoulder  Z98.890         Start Time: 1025  Stop Time: 1100  Total time in clinic (min): 35 minutes    Subjective: Pt reports no changes since last visit. Pt reports that he felt increase soreness following last session since he was off for a week.       Objective: See treatment diary below      Assessment: Tolerated treatment well. No changes since previous visit. Symptom presentation remains the same w/ no changes in PROM or AROM. Patient demonstrated fatigue post treatment, exhibited good technique with therapeutic exercises, and would benefit from continued PT for increased ROM, increased strength, and decreased symptom irritability.       Plan: Continue per plan of care.      Precautions: HTN, hx of lumbar fusion, Lyme's   POC expires Unit limit Auth Expiration date PT/OT/ST + Visit Limit?   25 BOMN N/A BOMN                           Visit/Unit Tracking  AUTH Status:  Date 3/4 3/6 3/18 3/20 3/25 3/27 4/1 4/3 4/15 4/17 4/22   N/A Used 7 8 9 10 11 12 13 14 15 16 17    Remaining                 PROM of shoulder to empty end feel with intermittently applied grade II mobilizations in   the open packed position of the shoulder. Maximum PROM is at week 6:   o 140° forward flexion   o 90° abduction  o 45° external rotation   o 35° internal rotation   Scaption pulleys at 10 minute maximum with 2 minute rest provided at 5 minutes   Pendulum, elbow AAROM and progressive gripping exercises     HEP: [id number]   Manuals 3/4 3/6 3/18 3/20 3/25 3/27 4/1 4/3 4/8 4/15 4/17 4/22                                                               Neuro Re-Ed               Repeated cervical retraction x30              Repeated cervical retraction w/ extension x30  x30 x30           Scap isometric   3x10 5\" 3x10 5\" 3x10 " "5\" 3x10 5\" 3x10 5\"        Prone Y,T,I        3x10 3\" ea 3x10 3\" ea 3x10 3\" ea 3x10 3\" ea 3x10 3\" ea                                                Ther Ex               Education                R shoulder PROM JMK JMK JMK JMK JMK JMK JMK JMK JMK HA JMK JMK   Scaption pulley's 5' x2 w/ rest 5' x2 w/ rest 5' x2 w/ rest 5' x2 w/ rest 5' x2 w/ rest 5' x2 w/ rest 5' x2 w/ rest 5' x2 w/ rest 5' x2 w/ rest 5'x2 w/ rest 5'x2 w/ rest 5'x2 w/ rest   Pendulums                AAROM flex, abd, ER X30 ea X30 ea X30 ea X30 ea X30 ea X30 ea X30 ea        Prone flexion w/ dowel        3x10 3x10 3# 3x10 3# 3x10 3# 3x10 3#                                                Ther Activity                                             Gait Training                                             Modalities                                                    " DISCHARGE

## 2025-07-10 ENCOUNTER — INPATIENT (INPATIENT)
Facility: HOSPITAL | Age: 50
LOS: 3 days | Discharge: HOME | End: 2025-07-14
Attending: INTERNAL MEDICINE | Admitting: INTERNAL MEDICINE
Payer: COMMERCIAL

## 2025-07-10 VITALS
DIASTOLIC BLOOD PRESSURE: 66 MMHG | OXYGEN SATURATION: 95 % | SYSTOLIC BLOOD PRESSURE: 114 MMHG | TEMPERATURE: 99 F | HEART RATE: 115 BPM | WEIGHT: 259.93 LBS | HEIGHT: 73 IN | RESPIRATION RATE: 18 BRPM

## 2025-07-10 LAB
ALBUMIN SERPL ELPH-MCNC: 3.4 G/DL — SIGNIFICANT CHANGE UP (ref 3.3–5)
ALP SERPL-CCNC: 52 U/L — SIGNIFICANT CHANGE UP (ref 40–120)
ALT FLD-CCNC: 66 U/L — SIGNIFICANT CHANGE UP (ref 12–78)
ANION GAP SERPL CALC-SCNC: 9 MMOL/L — SIGNIFICANT CHANGE UP (ref 5–17)
ANISOCYTOSIS BLD QL: SIGNIFICANT CHANGE UP
APPEARANCE UR: ABNORMAL
AST SERPL-CCNC: 41 U/L — HIGH (ref 15–37)
BACTERIA # UR AUTO: ABNORMAL /HPF
BASOPHILS # BLD AUTO: 0 K/UL — SIGNIFICANT CHANGE UP (ref 0–0.2)
BASOPHILS NFR BLD AUTO: 0 % — SIGNIFICANT CHANGE UP (ref 0–2)
BILIRUB SERPL-MCNC: 1.7 MG/DL — HIGH (ref 0.2–1.2)
BILIRUB UR-MCNC: ABNORMAL
BUN SERPL-MCNC: 21 MG/DL — SIGNIFICANT CHANGE UP (ref 7–23)
CALCIUM SERPL-MCNC: 9.9 MG/DL — SIGNIFICANT CHANGE UP (ref 8.5–10.1)
CHLORIDE SERPL-SCNC: 97 MMOL/L — SIGNIFICANT CHANGE UP (ref 96–108)
CO2 SERPL-SCNC: 27 MMOL/L — SIGNIFICANT CHANGE UP (ref 22–31)
COLOR SPEC: ABNORMAL
CREAT SERPL-MCNC: 2.13 MG/DL — HIGH (ref 0.5–1.3)
DIFF PNL FLD: ABNORMAL
EGFR: 37 ML/MIN/1.73M2 — LOW
EGFR: 37 ML/MIN/1.73M2 — LOW
EOSINOPHIL # BLD AUTO: 0 K/UL — SIGNIFICANT CHANGE UP (ref 0–0.5)
EOSINOPHIL NFR BLD AUTO: 0 % — SIGNIFICANT CHANGE UP (ref 0–6)
EPI CELLS # UR: PRESENT
FLUAV AG NPH QL: SIGNIFICANT CHANGE UP
FLUBV AG NPH QL: SIGNIFICANT CHANGE UP
GLUCOSE SERPL-MCNC: 158 MG/DL — HIGH (ref 70–99)
GLUCOSE UR QL: NEGATIVE MG/DL — SIGNIFICANT CHANGE UP
HCT VFR BLD CALC: 47.7 % — SIGNIFICANT CHANGE UP (ref 39–50)
HGB BLD-MCNC: 16.3 G/DL — SIGNIFICANT CHANGE UP (ref 13–17)
KETONES UR QL: ABNORMAL MG/DL
LACTATE SERPL-SCNC: 1.9 MMOL/L — SIGNIFICANT CHANGE UP (ref 0.7–2)
LEUKOCYTE ESTERASE UR-ACNC: ABNORMAL
LYMPHOCYTES # BLD AUTO: 1.68 K/UL — SIGNIFICANT CHANGE UP (ref 1–3.3)
LYMPHOCYTES # BLD AUTO: 9 % — LOW (ref 13–44)
MACROCYTES BLD QL: SIGNIFICANT CHANGE UP
MANUAL SMEAR VERIFICATION: SIGNIFICANT CHANGE UP
MCHC RBC-ENTMCNC: 29.7 PG — SIGNIFICANT CHANGE UP (ref 27–34)
MCHC RBC-ENTMCNC: 34.2 G/DL — SIGNIFICANT CHANGE UP (ref 32–36)
MCV RBC AUTO: 86.9 FL — SIGNIFICANT CHANGE UP (ref 80–100)
MONOCYTES # BLD AUTO: 1.68 K/UL — HIGH (ref 0–0.9)
MONOCYTES NFR BLD AUTO: 9 % — SIGNIFICANT CHANGE UP (ref 2–14)
NEUTROPHILS # BLD AUTO: 13.67 K/UL — HIGH (ref 1.8–7.4)
NEUTROPHILS NFR BLD AUTO: 71 % — SIGNIFICANT CHANGE UP (ref 43–77)
NEUTS BAND # BLD: 2 % — SIGNIFICANT CHANGE UP (ref 0–8)
NEUTS BAND NFR BLD: 2 % — SIGNIFICANT CHANGE UP (ref 0–8)
NITRITE UR-MCNC: POSITIVE
NRBC # BLD: 0 /100 WBCS — SIGNIFICANT CHANGE UP (ref 0–0)
NRBC BLD AUTO-RTO: SIGNIFICANT CHANGE UP /100 WBCS (ref 0–0)
NRBC BLD-RTO: 0 /100 WBCS — SIGNIFICANT CHANGE UP (ref 0–0)
OVALOCYTES BLD QL SMEAR: SLIGHT — SIGNIFICANT CHANGE UP
PH UR: 5 — SIGNIFICANT CHANGE UP (ref 5–8)
PLAT MORPH BLD: NORMAL — SIGNIFICANT CHANGE UP
PLATELET # BLD AUTO: 211 K/UL — SIGNIFICANT CHANGE UP (ref 150–400)
PLATELET COUNT - ESTIMATE: NORMAL — SIGNIFICANT CHANGE UP
POIKILOCYTOSIS BLD QL AUTO: SLIGHT — SIGNIFICANT CHANGE UP
POTASSIUM SERPL-MCNC: 4.4 MMOL/L — SIGNIFICANT CHANGE UP (ref 3.5–5.3)
POTASSIUM SERPL-SCNC: 4.4 MMOL/L — SIGNIFICANT CHANGE UP (ref 3.5–5.3)
PROT SERPL-MCNC: 8.9 GM/DL — HIGH (ref 6–8.3)
PROT UR-MCNC: 100 MG/DL
RBC # BLD: 5.49 M/UL — SIGNIFICANT CHANGE UP (ref 4.2–5.8)
RBC # FLD: 12.6 % — SIGNIFICANT CHANGE UP (ref 10.3–14.5)
RBC BLD AUTO: SIGNIFICANT CHANGE UP
RBC CASTS # UR COMP ASSIST: 5 /HPF — HIGH (ref 0–4)
RSV RNA NPH QL NAA+NON-PROBE: SIGNIFICANT CHANGE UP
SARS-COV-2 RNA SPEC QL NAA+PROBE: SIGNIFICANT CHANGE UP
SODIUM SERPL-SCNC: 133 MMOL/L — LOW (ref 135–145)
SOURCE RESPIRATORY: SIGNIFICANT CHANGE UP
SP GR SPEC: 1.03 — SIGNIFICANT CHANGE UP (ref 1–1.03)
SPHEROCYTES BLD QL SMEAR: SLIGHT — SIGNIFICANT CHANGE UP
UROBILINOGEN FLD QL: 2 MG/DL (ref 0.2–1)
VARIANT LYMPHS # BLD: 9 % — HIGH (ref 0–6)
VARIANT LYMPHS NFR BLD MANUAL: 9 % — HIGH (ref 0–6)
WBC # BLD: 18.72 K/UL — HIGH (ref 3.8–10.5)
WBC # FLD AUTO: 18.72 K/UL — HIGH (ref 3.8–10.5)
WBC UR QL: 8 /HPF — HIGH (ref 0–5)

## 2025-07-10 PROCEDURE — 99285 EMERGENCY DEPT VISIT HI MDM: CPT

## 2025-07-10 PROCEDURE — 71250 CT THORAX DX C-: CPT | Mod: 26

## 2025-07-10 PROCEDURE — 99223 1ST HOSP IP/OBS HIGH 75: CPT

## 2025-07-10 PROCEDURE — 74176 CT ABD & PELVIS W/O CONTRAST: CPT | Mod: 26

## 2025-07-10 PROCEDURE — 71045 X-RAY EXAM CHEST 1 VIEW: CPT | Mod: 26

## 2025-07-10 RX ORDER — CEFTRIAXONE 500 MG/1
1000 INJECTION, POWDER, FOR SOLUTION INTRAMUSCULAR; INTRAVENOUS ONCE
Refills: 0 | Status: COMPLETED | OUTPATIENT
Start: 2025-07-10 | End: 2025-07-10

## 2025-07-10 RX ORDER — DEXTROSE 50 % IN WATER 50 %
15 SYRINGE (ML) INTRAVENOUS ONCE
Refills: 0 | Status: DISCONTINUED | OUTPATIENT
Start: 2025-07-10 | End: 2025-07-14

## 2025-07-10 RX ORDER — ACETAMINOPHEN 500 MG/5ML
650 LIQUID (ML) ORAL EVERY 6 HOURS
Refills: 0 | Status: DISCONTINUED | OUTPATIENT
Start: 2025-07-10 | End: 2025-07-14

## 2025-07-10 RX ORDER — MELATONIN 5 MG
3 TABLET ORAL AT BEDTIME
Refills: 0 | Status: DISCONTINUED | OUTPATIENT
Start: 2025-07-10 | End: 2025-07-14

## 2025-07-10 RX ORDER — INSULIN LISPRO 100 U/ML
INJECTION, SOLUTION INTRAVENOUS; SUBCUTANEOUS
Refills: 0 | Status: DISCONTINUED | OUTPATIENT
Start: 2025-07-10 | End: 2025-07-14

## 2025-07-10 RX ORDER — DEXTROSE 50 % IN WATER 50 %
25 SYRINGE (ML) INTRAVENOUS ONCE
Refills: 0 | Status: DISCONTINUED | OUTPATIENT
Start: 2025-07-10 | End: 2025-07-14

## 2025-07-10 RX ORDER — ONDANSETRON HCL/PF 4 MG/2 ML
4 VIAL (ML) INJECTION EVERY 8 HOURS
Refills: 0 | Status: DISCONTINUED | OUTPATIENT
Start: 2025-07-10 | End: 2025-07-14

## 2025-07-10 RX ORDER — ACETAMINOPHEN 500 MG/5ML
1000 LIQUID (ML) ORAL ONCE
Refills: 0 | Status: COMPLETED | OUTPATIENT
Start: 2025-07-10 | End: 2025-07-10

## 2025-07-10 RX ORDER — MAGNESIUM, ALUMINUM HYDROXIDE 200-200 MG
30 TABLET,CHEWABLE ORAL EVERY 4 HOURS
Refills: 0 | Status: DISCONTINUED | OUTPATIENT
Start: 2025-07-10 | End: 2025-07-14

## 2025-07-10 RX ORDER — CEFTRIAXONE 500 MG/1
1000 INJECTION, POWDER, FOR SOLUTION INTRAMUSCULAR; INTRAVENOUS EVERY 24 HOURS
Refills: 0 | Status: DISCONTINUED | OUTPATIENT
Start: 2025-07-11 | End: 2025-07-14

## 2025-07-10 RX ORDER — GLUCAGON 3 MG/1
1 POWDER NASAL ONCE
Refills: 0 | Status: DISCONTINUED | OUTPATIENT
Start: 2025-07-10 | End: 2025-07-14

## 2025-07-10 RX ORDER — SODIUM CHLORIDE 9 G/1000ML
1000 INJECTION, SOLUTION INTRAVENOUS
Refills: 0 | Status: DISCONTINUED | OUTPATIENT
Start: 2025-07-10 | End: 2025-07-14

## 2025-07-10 RX ORDER — CEFTRIAXONE 500 MG/1
1000 INJECTION, POWDER, FOR SOLUTION INTRAMUSCULAR; INTRAVENOUS ONCE
Refills: 0 | Status: DISCONTINUED | OUTPATIENT
Start: 2025-07-10 | End: 2025-07-10

## 2025-07-10 RX ADMIN — Medication 1000 MILLIGRAM(S): at 20:32

## 2025-07-10 RX ADMIN — Medication 400 MILLIGRAM(S): at 18:57

## 2025-07-10 RX ADMIN — Medication 1000 MILLILITER(S): at 17:09

## 2025-07-10 RX ADMIN — Medication 1500 MILLILITER(S): at 19:28

## 2025-07-10 RX ADMIN — CEFTRIAXONE 1000 MILLIGRAM(S): 500 INJECTION, POWDER, FOR SOLUTION INTRAMUSCULAR; INTRAVENOUS at 18:57

## 2025-07-10 NOTE — ED ADULT NURSE NOTE - ED STAT RN HANDOFF DETAILS
report given to miquel go. pt resting in stretcher on RA. NADN att. aaox4, safety measures in place. IV patent and intact.

## 2025-07-10 NOTE — H&P ADULT - ASSESSMENT
50-year-old male with past medical history of diabetes & hypertension presents here today with fever at home, admitted for acute pyelonephritis.       #Pyelonehritis      #DM    Chronic medical conditions:  HTN:  50-year-old male with past medical history of diabetes & hypertension presents here today with fever at home, admitted for acute pyelonephritis.       ED course: Patient febrile with Tmax in .4, tachycardic, otherwise hemodynamically stable. Labs remarkable for WBC 18.72, Cr 2.13, T bili 1.7, UA showing cloudy urine/orange/100 protein/trace ketone/small blood/small bilirubin/2.0 urobilinogen/small LE/+ nitrite/8 WBC/5 RBC/few bacteria/+ sq epith. CT chest/abdomen/pelvis shows no acute findings, hepatic steatosis.     #Sepsis 2/2 Pyelonephritis  Patient presents with abdominal pain, flank pain, and fever. SIRS 3/4 for fever, tachycardia, and leukocytosis. UA showing cloudy urine/orange/100 protein/trace ketone/small blood/small bilirubin/2.0 urobilinogen/small LE/+ nitrite/8 WBC/5 RBC/few bacteria/+ sq epith. CT chest/abdomen/pelvis shows no acute findings, hepatic steatosis.   - Continue ceftriaxone  - Urine culture pending  - Blood culture pending    #STEFAN  Cr 2.13 on presentation. Baseline unknown.   - IVF hydration  - AM BMP    #DM  - ISS  - POCT glucose checks    Chronic medical conditions:  HTN: Continue lisinopril    DVT PPx: Low risk for VTE  Medication list acquired from patient.

## 2025-07-10 NOTE — ED ADULT NURSE NOTE - PATIENT'S SEXUAL ORIENTATION
Called pt back informed him of the order and that someone from IR will be calling for an appt.Darline Batres LPN    Heterosexual

## 2025-07-10 NOTE — ED PROVIDER NOTE - CLINICAL SUMMARY MEDICAL DECISION MAKING FREE TEXT BOX
50-year-old male with past medical history of diabetes, hypertension here today with fever at home.  He endorses shaking chills intermittent abdominal discomfort nausea.  No diarrhea.  He denies blood in the urine.  Reports he was feeling dizzy earlier which prompted him to come to the hospital.    In the ED, patient is tachycardic and febrile.    Sepsis orders initiated.    GENERAL: Awake, alert, NAD  HEENT: NC/AT, moist mucous membranes  LUNGS: CTAB, no wheezes or crackles   CARDIAC: tachycardic, regular rhythm, no m/r/g  ABDOMEN: Soft, non tender, non distended, no rebound, no guarding  BACK: No midline spinal tenderness, no CVA tenderness  EXT: No edema, no calf tenderness, no deformities.  NEURO: A&Ox3. Moving all extremities.  SKIN: Warm and dry. No rash.  PSYCH: Normal affect.    Exam as above.    Patient w/ sepsis.  Will bolus fluids/abx  Plan for labs, CT imaging to further evaluate    Labs + for UTI; likely urosepsis, CT non focal, will treat for pyelo in setting of abd pain

## 2025-07-10 NOTE — H&P ADULT - HISTORY OF PRESENT ILLNESS
50-year-old male with past medical history of diabetes & hypertension presents here today with fever at home.      ED course: Patient febrile with Tmax in .4, tachycardic, otherwise hemodynamically stable. Labs remarkable for WBC 18.72, Cr 2.13, T bili 1.7, UA showing cloudy urine/orange/100 protein/trace ketone/small blood/small bilirubin/2.0 urobilinogen/small LE/+ nitrite/8 WBC/5 RBC/few bacteria/+ sq epith. CT chest/abdomen/pelvis shows no acute findings, hepatic steatosis.  50-year-old male with past medical history of diabetes & hypertension presents here today with fever at home. Patient states that he has been suffering from super pubic abdominal pain for two days now. Patient also endorses right flank pain that he states is worse with eating. Patient also endorses chills times one day and fever that started today. He endorses, urgency, dizziness, and headaches as well. He denies nausea, vomiting, dysuria, frequency.      ED course: Patient febrile with Tmax in .4, tachycardic, otherwise hemodynamically stable. Labs remarkable for WBC 18.72, Cr 2.13, T bili 1.7, UA showing cloudy urine/orange/100 protein/trace ketone/small blood/small bilirubin/2.0 urobilinogen/small LE/+ nitrite/8 WBC/5 RBC/few bacteria/+ sq epith. CT chest/abdomen/pelvis shows no acute findings, hepatic steatosis.  50-year-old male with past medical history of diabetes & hypertension presents here today with fever at home. Patient states that he has been suffering from suprapubic abdominal pain for two days now. Patient also endorses right flank pain that he states is worse with eating. Patient also endorses chills x1 day and fever that started today. He endorses urgency, dizziness, and headaches as well. He denies nausea, vomiting, dysuria, frequency.    ED course: Patient febrile with Tmax in .4, tachycardic, otherwise hemodynamically stable. Labs remarkable for WBC 18.72, Cr 2.13, T bili 1.7, UA showing cloudy urine/orange/100 protein/trace ketone/small blood/small bilirubin/2.0 urobilinogen/small LE/+ nitrite/8 WBC/5 RBC/few bacteria/+ sq epith. CT chest/abdomen/pelvis shows no acute findings, hepatic steatosis.

## 2025-07-10 NOTE — ED ADULT TRIAGE NOTE - CHIEF COMPLAINT QUOTE
BIBEMS from Urgent care c/o having c/o having dizziness chills and not feeling well after eating jerk chicken x 3 days ago, pt reports he  went to  and had a negative covid test but still complains of dizziness pt AAO x 3 has hx of HTN DM and using Ozempic x 3 weeks denies N/V /D  fevers or sick contacts

## 2025-07-10 NOTE — ED ADULT NURSE NOTE - OBJECTIVE STATEMENT
Patient came into ED for Chills, Sweating, Body/headaches with dizziness for 2 days now, today symptom worsen.  No sick contacts. Febrile.   starting to have a sore throat and cough.

## 2025-07-10 NOTE — H&P ADULT - NSHPLABSRESULTS_GEN_ALL_CORE
.  LABS:                         16.3   18.72 )-----------( 211      ( 10 Jul 2025 16:49 )             47.7     07-10    133[L]  |  97  |  21  ----------------------------<  158[H]  4.4   |  27  |  2.13[H]    Ca    9.9      10 Jul 2025 16:49    TPro  8.9[H]  /  Alb  3.4  /  TBili  1.7[H]  /  DBili  x   /  AST  41[H]  /  ALT  66  /  AlkPhos  52  07-10      Urinalysis Basic - ( 10 Jul 2025 17:00 )    Color: Orange / Appearance: Cloudy / S.026 / pH: x  Gluc: x / Ketone: x  / Bili: Small / Urobili: 2.0 mg/dL   Blood: x / Protein: 100 mg/dL / Nitrite: Positive   Leuk Esterase: Small / RBC: 5 /HPF / WBC 8 /HPF   Sq Epi: x / Non Sq Epi: x / Bacteria: Few /HPF        Lactate, Blood: 1.9 mmol/L (07-10 @ 18:45)      RADIOLOGY, EKG & ADDITIONAL TESTS: Reviewed.

## 2025-07-10 NOTE — ED ADULT NURSE NOTE - NSFALLHARMRISKINTERV_ED_ALL_ED
Assistance OOB with selected safe patient handling equipment if applicable/Assistance with ambulation/Communicate risk of Fall with Harm to all staff, patient, and family/Encourage patient to sit up slowly, dangle for a short time, stand at bedside before walking/Monitor gait and stability/Orthostatic vital signs/Provide visual cue: red socks, yellow wristband, yellow gown, etc/Reinforce activity limits and safety measures with patient and family/Bed in lowest position, wheels locked, appropriate side rails in place/Call bell, personal items and telephone in reach/Instruct patient to call for assistance before getting out of bed/chair/stretcher/Non-slip footwear applied when patient is off stretcher/[place] to call system/Physically safe environment - no spills, clutter or unnecessary equipment/Purposeful Proactive Rounding/Room/bathroom lighting operational, light cord in reach

## 2025-07-10 NOTE — H&P ADULT - NSHPPHYSICALEXAM_GEN_ALL_CORE
Vital Signs Last 24 Hrs  T(C): 37.6 (11 Jul 2025 00:00), Max: 38.6 (10 Jul 2025 17:15)  T(F): 99.6 (11 Jul 2025 00:00), Max: 101.4 (10 Jul 2025 17:15)  HR: 104 (11 Jul 2025 00:00) (104 - 115)  BP: 124/79 (11 Jul 2025 00:00) (114/66 - 124/79)  BP(mean): --  RR: 18 (11 Jul 2025 00:00) (18 - 18)  SpO2: 99% (11 Jul 2025 00:00) (95% - 99%)    Parameters below as of 11 Jul 2025 00:00  Patient On (Oxygen Delivery Method): room air    GENERAL: NAD, lying in bed comfortably  HEAD:  Atraumatic, normocephalic  EYES: EOMI, PERRL  NECK: Supple, trachea midline, no JVD  HEART: Regular rate and rhythm  LUNGS: Unlabored respirations.  Clear to auscultation bilaterally, no crackles, wheezing, or rhonchi  ABDOMEN: Soft, nontender, nondistended, +BS  EXTREMITIES: 2+ peripheral pulses bilaterally. No clubbing, cyanosis, or edema  NERVOUS SYSTEM:  A&Ox3, moving all extremities, no focal deficits

## 2025-07-11 DIAGNOSIS — E11.9 TYPE 2 DIABETES MELLITUS WITHOUT COMPLICATIONS: ICD-10-CM

## 2025-07-11 DIAGNOSIS — A41.9 SEPSIS, UNSPECIFIED ORGANISM: ICD-10-CM

## 2025-07-11 DIAGNOSIS — N12 TUBULO-INTERSTITIAL NEPHRITIS, NOT SPECIFIED AS ACUTE OR CHRONIC: ICD-10-CM

## 2025-07-11 DIAGNOSIS — N17.9 ACUTE KIDNEY FAILURE, UNSPECIFIED: ICD-10-CM

## 2025-07-11 LAB
A1C WITH ESTIMATED AVERAGE GLUCOSE RESULT: 7.7 % — HIGH (ref 4–5.6)
ALBUMIN SERPL ELPH-MCNC: 2.8 G/DL — LOW (ref 3.3–5)
ALP SERPL-CCNC: 55 U/L — SIGNIFICANT CHANGE UP (ref 40–120)
ALT FLD-CCNC: 62 U/L — SIGNIFICANT CHANGE UP (ref 12–78)
ANION GAP SERPL CALC-SCNC: 5 MMOL/L — SIGNIFICANT CHANGE UP (ref 5–17)
AST SERPL-CCNC: 40 U/L — HIGH (ref 15–37)
BASOPHILS # BLD AUTO: 0.01 K/UL — SIGNIFICANT CHANGE UP (ref 0–0.2)
BASOPHILS NFR BLD AUTO: 0.1 % — SIGNIFICANT CHANGE UP (ref 0–2)
BILIRUB SERPL-MCNC: 1.3 MG/DL — HIGH (ref 0.2–1.2)
BUN SERPL-MCNC: 15 MG/DL — SIGNIFICANT CHANGE UP (ref 7–23)
CALCIUM SERPL-MCNC: 8.5 MG/DL — SIGNIFICANT CHANGE UP (ref 8.5–10.1)
CHLORIDE SERPL-SCNC: 103 MMOL/L — SIGNIFICANT CHANGE UP (ref 96–108)
CO2 SERPL-SCNC: 25 MMOL/L — SIGNIFICANT CHANGE UP (ref 22–31)
CREAT SERPL-MCNC: 1.23 MG/DL — SIGNIFICANT CHANGE UP (ref 0.5–1.3)
EGFR: 72 ML/MIN/1.73M2 — SIGNIFICANT CHANGE UP
EGFR: 72 ML/MIN/1.73M2 — SIGNIFICANT CHANGE UP
EOSINOPHIL # BLD AUTO: 0 K/UL — SIGNIFICANT CHANGE UP (ref 0–0.5)
EOSINOPHIL NFR BLD AUTO: 0 % — SIGNIFICANT CHANGE UP (ref 0–6)
ESTIMATED AVERAGE GLUCOSE: 174 MG/DL — HIGH (ref 68–114)
GLUCOSE BLDC GLUCOMTR-MCNC: 101 MG/DL — HIGH (ref 70–99)
GLUCOSE BLDC GLUCOMTR-MCNC: 123 MG/DL — HIGH (ref 70–99)
GLUCOSE BLDC GLUCOMTR-MCNC: 153 MG/DL — HIGH (ref 70–99)
GLUCOSE BLDC GLUCOMTR-MCNC: 198 MG/DL — HIGH (ref 70–99)
GLUCOSE SERPL-MCNC: 150 MG/DL — HIGH (ref 70–99)
HCT VFR BLD CALC: 44.6 % — SIGNIFICANT CHANGE UP (ref 39–50)
HGB BLD-MCNC: 14.6 G/DL — SIGNIFICANT CHANGE UP (ref 13–17)
IMM GRANULOCYTES NFR BLD AUTO: 0.4 % — SIGNIFICANT CHANGE UP (ref 0–0.9)
LYMPHOCYTES # BLD AUTO: 18.2 % — SIGNIFICANT CHANGE UP (ref 13–44)
LYMPHOCYTES # BLD AUTO: 2.6 K/UL — SIGNIFICANT CHANGE UP (ref 1–3.3)
MCHC RBC-ENTMCNC: 28.9 PG — SIGNIFICANT CHANGE UP (ref 27–34)
MCHC RBC-ENTMCNC: 32.7 G/DL — SIGNIFICANT CHANGE UP (ref 32–36)
MCV RBC AUTO: 88.1 FL — SIGNIFICANT CHANGE UP (ref 80–100)
MONOCYTES # BLD AUTO: 1.66 K/UL — HIGH (ref 0–0.9)
MONOCYTES NFR BLD AUTO: 11.6 % — SIGNIFICANT CHANGE UP (ref 2–14)
NEUTROPHILS # BLD AUTO: 9.95 K/UL — HIGH (ref 1.8–7.4)
NEUTROPHILS NFR BLD AUTO: 69.7 % — SIGNIFICANT CHANGE UP (ref 43–77)
NRBC BLD AUTO-RTO: 0 /100 WBCS — SIGNIFICANT CHANGE UP (ref 0–0)
PLATELET # BLD AUTO: 179 K/UL — SIGNIFICANT CHANGE UP (ref 150–400)
POTASSIUM SERPL-MCNC: 4.1 MMOL/L — SIGNIFICANT CHANGE UP (ref 3.5–5.3)
POTASSIUM SERPL-SCNC: 4.1 MMOL/L — SIGNIFICANT CHANGE UP (ref 3.5–5.3)
PROT SERPL-MCNC: 7.9 GM/DL — SIGNIFICANT CHANGE UP (ref 6–8.3)
RBC # BLD: 5.06 M/UL — SIGNIFICANT CHANGE UP (ref 4.2–5.8)
RBC # FLD: 12.9 % — SIGNIFICANT CHANGE UP (ref 10.3–14.5)
SODIUM SERPL-SCNC: 133 MMOL/L — LOW (ref 135–145)
WBC # BLD: 14.28 K/UL — HIGH (ref 3.8–10.5)
WBC # FLD AUTO: 14.28 K/UL — HIGH (ref 3.8–10.5)

## 2025-07-11 PROCEDURE — 99232 SBSQ HOSP IP/OBS MODERATE 35: CPT

## 2025-07-11 RX ORDER — SEMAGLUTIDE 1 MG/.5ML
0 INJECTION, SOLUTION SUBCUTANEOUS
Refills: 0 | DISCHARGE

## 2025-07-11 RX ORDER — BISACODYL 5 MG
5 TABLET, DELAYED RELEASE (ENTERIC COATED) ORAL DAILY
Refills: 0 | Status: DISCONTINUED | OUTPATIENT
Start: 2025-07-11 | End: 2025-07-14

## 2025-07-11 RX ORDER — POLYETHYLENE GLYCOL 3350 17 G/17G
17 POWDER, FOR SOLUTION ORAL DAILY
Refills: 0 | Status: DISCONTINUED | OUTPATIENT
Start: 2025-07-11 | End: 2025-07-14

## 2025-07-11 RX ORDER — NALOXONE HYDROCHLORIDE 0.4 MG/ML
0.4 INJECTION, SOLUTION INTRAMUSCULAR; INTRAVENOUS; SUBCUTANEOUS ONCE
Refills: 0 | Status: DISCONTINUED | OUTPATIENT
Start: 2025-07-11 | End: 2025-07-14

## 2025-07-11 RX ORDER — ACETAMINOPHEN 500 MG/5ML
1000 LIQUID (ML) ORAL ONCE
Refills: 0 | Status: DISCONTINUED | OUTPATIENT
Start: 2025-07-11 | End: 2025-07-11

## 2025-07-11 RX ORDER — LISINOPRIL 5 MG/1
20 TABLET ORAL DAILY
Refills: 0 | Status: DISCONTINUED | OUTPATIENT
Start: 2025-07-11 | End: 2025-07-14

## 2025-07-11 RX ORDER — ACETAMINOPHEN 500 MG/5ML
500 LIQUID (ML) ORAL ONCE
Refills: 0 | Status: COMPLETED | OUTPATIENT
Start: 2025-07-11 | End: 2025-07-11

## 2025-07-11 RX ORDER — SENNA 187 MG
2 TABLET ORAL AT BEDTIME
Refills: 0 | Status: DISCONTINUED | OUTPATIENT
Start: 2025-07-11 | End: 2025-07-14

## 2025-07-11 RX ADMIN — Medication 650 MILLIGRAM(S): at 12:32

## 2025-07-11 RX ADMIN — Medication 75 MILLILITER(S): at 00:39

## 2025-07-11 RX ADMIN — Medication 4 MILLIGRAM(S): at 18:58

## 2025-07-11 RX ADMIN — Medication 500 MILLIGRAM(S): at 05:15

## 2025-07-11 RX ADMIN — Medication 650 MILLIGRAM(S): at 07:33

## 2025-07-11 RX ADMIN — Medication 75 MILLILITER(S): at 12:27

## 2025-07-11 RX ADMIN — INSULIN LISPRO 2: 100 INJECTION, SOLUTION INTRAVENOUS; SUBCUTANEOUS at 12:28

## 2025-07-11 RX ADMIN — Medication 650 MILLIGRAM(S): at 01:00

## 2025-07-11 RX ADMIN — Medication 650 MILLIGRAM(S): at 15:53

## 2025-07-11 RX ADMIN — Medication 650 MILLIGRAM(S): at 00:39

## 2025-07-11 RX ADMIN — Medication 200 MILLIGRAM(S): at 02:12

## 2025-07-11 RX ADMIN — POLYETHYLENE GLYCOL 3350 17 GRAM(S): 17 POWDER, FOR SOLUTION ORAL at 12:29

## 2025-07-11 RX ADMIN — CEFTRIAXONE 100 MILLIGRAM(S): 500 INJECTION, POWDER, FOR SOLUTION INTRAMUSCULAR; INTRAVENOUS at 23:51

## 2025-07-11 RX ADMIN — CEFTRIAXONE 100 MILLIGRAM(S): 500 INJECTION, POWDER, FOR SOLUTION INTRAMUSCULAR; INTRAVENOUS at 00:42

## 2025-07-11 RX ADMIN — Medication 75 MILLILITER(S): at 23:53

## 2025-07-11 RX ADMIN — INSULIN LISPRO 2: 100 INJECTION, SOLUTION INTRAVENOUS; SUBCUTANEOUS at 08:50

## 2025-07-11 RX ADMIN — LISINOPRIL 20 MILLIGRAM(S): 5 TABLET ORAL at 05:11

## 2025-07-11 RX ADMIN — Medication 650 MILLIGRAM(S): at 06:34

## 2025-07-11 NOTE — PROGRESS NOTE ADULT - ASSESSMENT
50-year-old male with past medical history of diabetes & hypertension presents here today with fever at home, admitted for acute pyelonephritis.       ED course: Patient febrile with Tmax in .4, tachycardic, otherwise hemodynamically stable. Labs remarkable for WBC 18.72, Cr 2.13, T bili 1.7, UA showing cloudy urine/orange/100 protein/trace ketone/small blood/small bilirubin/2.0 urobilinogen/small LE/+ nitrite/8 WBC/5 RBC/few bacteria/+ sq epith. CT chest/abdomen/pelvis shows no acute findings, hepatic steatosis.     #Sepsis 2/2  UTI  Patient presents with abdominal pain, flank pain, and fever. SIRS 3/4 for fever, tachycardia, and leukocytosis. UA showing cloudy urine/orange/100 protein/trace ketone/small blood/small bilirubin/2.0 urobilinogen/small LE/+ nitrite/8 WBC/5 RBC/few bacteria/+ sq epith. CT chest/abdomen/pelvis shows no acute findings, hepatic steatosis.   - Continue ceftriaxone  - c/w IVF  - Urine culture pending  - Blood culture pending    #STEFAN, resolved  - Cr 2.13 on presentation. Baseline unknown>>1.2.   - IVF hydration  - f/u am BMP    #DM  - ISS  - POCT glucose checks    Chronic medical conditions:  HTN: Continue lisinopril    DVT PPx: Low risk for VTE  Medication list acquired from patient.

## 2025-07-11 NOTE — PATIENT PROFILE ADULT - NSPRESCRUSEDDRG_GEN_A_NUR
-Eat diet with regular meals including 2-4 fruit servings , 2-4 vegetable servings, whole grains and lean protien each day  -control portions of carbs and keep down fats and sugars in diet  -exercise for 150 mins per week of cardio or more and 1-2 days per week of something strengthening like yoga, pilates or lifting  -wear sunblock w SPF of 30 or higher  in sun to protect your skin, and reapply often  -avoid all tobacco products  -limit alcohol and caffiene  -aim to get 6-8 hrs of sleep each night  -see your eye doctor every 2-3 yrs  -see your dentist every 6-12 mos  -complete labwork as directed  FU for physical in one year        
See History of Present Illness section for assessment and plan   
No

## 2025-07-12 LAB
ALBUMIN SERPL ELPH-MCNC: 2.7 G/DL — LOW (ref 3.3–5)
ALP SERPL-CCNC: 55 U/L — SIGNIFICANT CHANGE UP (ref 40–120)
ALT FLD-CCNC: 56 U/L — SIGNIFICANT CHANGE UP (ref 12–78)
ANION GAP SERPL CALC-SCNC: 7 MMOL/L — SIGNIFICANT CHANGE UP (ref 5–17)
AST SERPL-CCNC: 27 U/L — SIGNIFICANT CHANGE UP (ref 15–37)
BILIRUB SERPL-MCNC: 1.4 MG/DL — HIGH (ref 0.2–1.2)
BUN SERPL-MCNC: 11 MG/DL — SIGNIFICANT CHANGE UP (ref 7–23)
CALCIUM SERPL-MCNC: 9.2 MG/DL — SIGNIFICANT CHANGE UP (ref 8.5–10.1)
CHLORIDE SERPL-SCNC: 100 MMOL/L — SIGNIFICANT CHANGE UP (ref 96–108)
CO2 SERPL-SCNC: 26 MMOL/L — SIGNIFICANT CHANGE UP (ref 22–31)
CREAT SERPL-MCNC: 1.06 MG/DL — SIGNIFICANT CHANGE UP (ref 0.5–1.3)
CULTURE RESULTS: SIGNIFICANT CHANGE UP
EGFR: 86 ML/MIN/1.73M2 — SIGNIFICANT CHANGE UP
EGFR: 86 ML/MIN/1.73M2 — SIGNIFICANT CHANGE UP
GLUCOSE BLDC GLUCOMTR-MCNC: 103 MG/DL — HIGH (ref 70–99)
GLUCOSE BLDC GLUCOMTR-MCNC: 109 MG/DL — HIGH (ref 70–99)
GLUCOSE BLDC GLUCOMTR-MCNC: 114 MG/DL — HIGH (ref 70–99)
GLUCOSE BLDC GLUCOMTR-MCNC: 124 MG/DL — HIGH (ref 70–99)
GLUCOSE SERPL-MCNC: 123 MG/DL — HIGH (ref 70–99)
HCT VFR BLD CALC: 40.4 % — SIGNIFICANT CHANGE UP (ref 39–50)
HGB BLD-MCNC: 13.6 G/DL — SIGNIFICANT CHANGE UP (ref 13–17)
MCHC RBC-ENTMCNC: 29.7 PG — SIGNIFICANT CHANGE UP (ref 27–34)
MCHC RBC-ENTMCNC: 33.7 G/DL — SIGNIFICANT CHANGE UP (ref 32–36)
MCV RBC AUTO: 88.2 FL — SIGNIFICANT CHANGE UP (ref 80–100)
NRBC BLD AUTO-RTO: 0 /100 WBCS — SIGNIFICANT CHANGE UP (ref 0–0)
PLATELET # BLD AUTO: 168 K/UL — SIGNIFICANT CHANGE UP (ref 150–400)
POTASSIUM SERPL-MCNC: 4 MMOL/L — SIGNIFICANT CHANGE UP (ref 3.5–5.3)
POTASSIUM SERPL-SCNC: 4 MMOL/L — SIGNIFICANT CHANGE UP (ref 3.5–5.3)
PROT SERPL-MCNC: 7.6 GM/DL — SIGNIFICANT CHANGE UP (ref 6–8.3)
RBC # BLD: 4.58 M/UL — SIGNIFICANT CHANGE UP (ref 4.2–5.8)
RBC # FLD: 12.9 % — SIGNIFICANT CHANGE UP (ref 10.3–14.5)
SODIUM SERPL-SCNC: 133 MMOL/L — LOW (ref 135–145)
SPECIMEN SOURCE: SIGNIFICANT CHANGE UP
WBC # BLD: 14.11 K/UL — HIGH (ref 3.8–10.5)
WBC # FLD AUTO: 14.11 K/UL — HIGH (ref 3.8–10.5)

## 2025-07-12 PROCEDURE — 99232 SBSQ HOSP IP/OBS MODERATE 35: CPT

## 2025-07-12 RX ADMIN — Medication 4 MILLIGRAM(S): at 19:54

## 2025-07-12 RX ADMIN — POLYETHYLENE GLYCOL 3350 17 GRAM(S): 17 POWDER, FOR SOLUTION ORAL at 16:14

## 2025-07-12 RX ADMIN — CEFTRIAXONE 100 MILLIGRAM(S): 500 INJECTION, POWDER, FOR SOLUTION INTRAMUSCULAR; INTRAVENOUS at 23:22

## 2025-07-12 RX ADMIN — LISINOPRIL 20 MILLIGRAM(S): 5 TABLET ORAL at 05:38

## 2025-07-12 RX ADMIN — Medication 4 MILLIGRAM(S): at 04:08

## 2025-07-12 RX ADMIN — Medication 4 MILLIGRAM(S): at 20:54

## 2025-07-12 RX ADMIN — Medication 4 MILLIGRAM(S): at 11:57

## 2025-07-12 RX ADMIN — Medication 650 MILLIGRAM(S): at 01:00

## 2025-07-12 RX ADMIN — Medication 75 MILLILITER(S): at 05:37

## 2025-07-12 RX ADMIN — Medication 4 MILLIGRAM(S): at 05:08

## 2025-07-12 RX ADMIN — Medication 650 MILLIGRAM(S): at 23:43

## 2025-07-12 RX ADMIN — Medication 2 TABLET(S): at 05:37

## 2025-07-12 RX ADMIN — Medication 650 MILLIGRAM(S): at 00:00

## 2025-07-12 RX ADMIN — Medication 4 MILLIGRAM(S): at 12:50

## 2025-07-12 NOTE — PROGRESS NOTE ADULT - ASSESSMENT
50-year-old male with past medical history of diabetes & hypertension presents here today with fever at home, admitted for acute pyelonephritis.     ED course: Patient febrile with Tmax in .4, tachycardic, otherwise hemodynamically stable. Labs remarkable for WBC 18.72, Cr 2.13, T bili 1.7, UA showing cloudy urine/orange/100 protein/trace ketone/small blood/small bilirubin/2.0 urobilinogen/small LE/+ nitrite/8 WBC/5 RBC/few bacteria/+ sq epith. CT chest/abdomen/pelvis shows no acute findings, hepatic steatosis.     #Sepsis 2/2  UTI  Patient presents with abdominal pain, flank pain, and fever. SIRS 3/4 for fever, tachycardia, and leukocytosis. UA showing cloudy urine/orange/100 protein/trace ketone/small blood/small bilirubin/2.0 urobilinogen/small LE/+ nitrite/8 WBC/5 RBC/few bacteria/+ sq epith. CT chest/abdomen/pelvis shows no acute findings, hepatic steatosis.   - Continue ceftriaxone  - c/w IVF  - Urine culture pending  - Blood culture pending    #STEFAN, resolved  - Cr 2.13 on presentation. Baseline unknown>>1.2.   - IVF hydration  - f/u am BMP    #DM  - ISS  - POCT glucose checks    Chronic medical conditions:  HTN: Continue lisinopril    DVT PPx: Low risk for VTE  Medication list acquired from patient. 50-year-old male with past medical history of diabetes & hypertension presents here today with fever at home, admitted for acute pyelonephritis.     ED course: Patient febrile with Tmax in .4, tachycardic, otherwise hemodynamically stable. Labs remarkable for WBC 18.72, Cr 2.13, T bili 1.7, UA showing cloudy urine/orange/100 protein/trace ketone/small blood/small bilirubin/2.0 urobilinogen/small LE/+ nitrite/8 WBC/5 RBC/few bacteria/+ sq epith. CT chest/abdomen/pelvis shows no acute findings, hepatic steatosis.     #Sepsis 2/2  UTI  Patient presents with abdominal pain, flank pain, and fever. SIRS 3/4 for fever, tachycardia, and leukocytosis. UA showing cloudy urine/orange/100 protein/trace ketone/small blood/small bilirubin/2.0 urobilinogen/small LE/+ nitrite/8 WBC/5 RBC/few bacteria/+ sq epith. CT chest/abdomen/pelvis shows no acute findings, hepatic steatosis.   - Continue ceftriaxone  - c/w IVF  - Urine culture NGTD  - Blood culture NGTD    #STEFAN, resolved  - Cr 2.13 on presentation. Baseline unknown>>1.2.   - IVF hydration  - f/u am BMP    #DM  - ISS  - POCT glucose checks    Chronic medical conditions:  HTN: Continue lisinopril    DVT PPx: Low risk for VTE    Dispo: tomorrow home with po antibiotics

## 2025-07-13 LAB
GLUCOSE BLDC GLUCOMTR-MCNC: 102 MG/DL — HIGH (ref 70–99)
GLUCOSE BLDC GLUCOMTR-MCNC: 103 MG/DL — HIGH (ref 70–99)
GLUCOSE BLDC GLUCOMTR-MCNC: 105 MG/DL — HIGH (ref 70–99)
GLUCOSE BLDC GLUCOMTR-MCNC: 121 MG/DL — HIGH (ref 70–99)

## 2025-07-13 PROCEDURE — 99232 SBSQ HOSP IP/OBS MODERATE 35: CPT

## 2025-07-13 PROCEDURE — 76770 US EXAM ABDO BACK WALL COMP: CPT | Mod: 26

## 2025-07-13 RX ADMIN — Medication 4 MILLIGRAM(S): at 22:18

## 2025-07-13 RX ADMIN — CEFTRIAXONE 100 MILLIGRAM(S): 500 INJECTION, POWDER, FOR SOLUTION INTRAMUSCULAR; INTRAVENOUS at 23:54

## 2025-07-13 RX ADMIN — Medication 2 TABLET(S): at 21:17

## 2025-07-13 RX ADMIN — Medication 75 MILLILITER(S): at 06:05

## 2025-07-13 RX ADMIN — Medication 4 MILLIGRAM(S): at 21:18

## 2025-07-13 RX ADMIN — LISINOPRIL 20 MILLIGRAM(S): 5 TABLET ORAL at 08:19

## 2025-07-13 RX ADMIN — Medication 650 MILLIGRAM(S): at 00:43

## 2025-07-13 NOTE — PROGRESS NOTE ADULT - ASSESSMENT
50-year-old male with past medical history of diabetes & hypertension presents here today with fever at home, admitted for acute pyelonephritis.     ED course: Patient febrile with Tmax in .4, tachycardic, otherwise hemodynamically stable. Labs remarkable for WBC 18.72, Cr 2.13, T bili 1.7, UA showing cloudy urine/orange/100 protein/trace ketone/small blood/small bilirubin/2.0 urobilinogen/small LE/+ nitrite/8 WBC/5 RBC/few bacteria/+ sq epith. CT chest/abdomen/pelvis shows no acute findings, hepatic steatosis.     #Sepsis 2/2  UTI, possible pyelonephritis  - Patient presents with abdominal pain, flank pain, and fever. SIRS 3/4 for fever, tachycardia, and leukocytosis. UA showing cloudy urine/orange/100 protein/trace ketone/small blood/small bilirubin/2.0 urobilinogen/small LE/+ nitrite/8 WBC/5 RBC/few bacteria/+ sq epith. CT chest/abdomen/pelvis shows no acute findings, hepatic steatosis.   - Continue ceftriaxone  - c/w IVF  - Urine culture NGTD  - Blood culture NGTD  - 7/13: febrile, will send repeat Bcx, f/u renal u/s     #STEFAN, resolved  - Cr 2.13 on presentation. Baseline unknown>>1.2.   - IVF hydration  - f/u am BMP    #DM  - ISS  - POCT glucose checks    Chronic medical conditions:  HTN: Continue lisinopril    DVT PPx: Low risk for VTE    Dispo: pending blood culture results, and kidney u/s, monitor fever curve

## 2025-07-13 NOTE — PROGRESS NOTE ADULT - SUBJECTIVE AND OBJECTIVE BOX
Patient is a 50y old  Male who presents with a chief complaint of Sepsis 2/2 UTI (11 Jul 2025 13:16)    Pt seen and examined at bedside. Febrile last night. AAOx3. Reports L flank pain, intermittent 6/10 , nonradiating. Denies any chills, dysuria, hematuria, chest pain, nausea, vomiting, diarrhea, or any other active complaints.     ROS negative except for above    Vital Signs Last 24 Hrs  T(C): 37.3 (13 Jul 2025 12:26), Max: 38.6 (12 Jul 2025 23:26)  T(F): 99.1 (13 Jul 2025 12:26), Max: 101.4 (12 Jul 2025 23:26)  HR: 92 (13 Jul 2025 12:26) (92 - 99)  BP: 106/69 (13 Jul 2025 12:26) (104/67 - 135/82)  BP(mean): --  RR: 18 (13 Jul 2025 05:24) (18 - 18)  SpO2: 97% (13 Jul 2025 12:26) (95% - 97%)    Parameters below as of 12 Jul 2025 17:11  Patient On (Oxygen Delivery Method): room air      GENERAL: NAD, lying in bed comfortably  HEAD:  Atraumatic, normocephalic  EYES: EOMI, PERRL  NECK: Supple, trachea midline, no JVD  HEART: Regular rate and rhythm  LUNGS: Unlabored respirations.  Clear to auscultation bilaterally, no crackles, wheezing, or rhonchi  ABDOMEN: Soft, nontender, nondistended, +BS  EXTREMITIES: 2+ peripheral pulses bilaterally. No clubbing, cyanosis, or edema  NERVOUS SYSTEM:  A&Ox3, moving all extremities, no focal deficits           LABS:  cret                        13.6   14.11 )-----------( 168      ( 12 Jul 2025 09:07 )             40.4     07-12    133[L]  |  100  |  11  ----------------------------<  123[H]  4.0   |  26  |  1.06    Ca    9.2      12 Jul 2025 09:07    TPro  7.6  /  Alb  2.7[L]  /  TBili  1.4[H]  /  DBili  x   /  AST  27  /  ALT  56  /  AlkPhos  55  07-12      < from: CT Abdomen and Pelvis No Cont (07.10.25 @ 18:23) >  IMPRESSION:  No acute findings.    Hepatic steatosis.    < end of copied text >    MEDICATIONS  (STANDING):  cefTRIAXone   IVPB 1000 milliGRAM(s) IV Intermittent every 24 hours  dextrose 5%. 1000 milliLiter(s) (50 mL/Hr) IV Continuous <Continuous>  dextrose 50% Injectable 25 Gram(s) IV Push once  glucagon  Injectable 1 milliGRAM(s) IntraMuscular once  insulin lispro (ADMELOG) corrective regimen sliding scale   SubCutaneous three times a day before meals  lisinopril 20 milliGRAM(s) Oral daily  naloxone Injectable 0.4 milliGRAM(s) IV Push once  polyethylene glycol 3350 17 Gram(s) Oral daily  senna 2 Tablet(s) Oral at bedtime  sodium chloride 0.9%. 1000 milliLiter(s) (75 mL/Hr) IV Continuous <Continuous>    MEDICATIONS  (PRN):  acetaminophen     Tablet .. 650 milliGRAM(s) Oral every 6 hours PRN Temp greater or equal to 38C (100.4F), Mild Pain (1 - 3)  aluminum hydroxide/magnesium hydroxide/simethicone Suspension 30 milliLiter(s) Oral every 4 hours PRN Dyspepsia  bisacodyl 5 milliGRAM(s) Oral daily PRN Constipation  dextrose Oral Gel 15 Gram(s) Oral once PRN Blood Glucose LESS THAN 70 milliGRAM(s)/deciliter  melatonin 3 milliGRAM(s) Oral at bedtime PRN Insomnia  morphine  - Injectable 2 milliGRAM(s) IV Push every 4 hours PRN Moderate Pain (4 - 6)  morphine  - Injectable 4 milliGRAM(s) IV Push every 4 hours PRN Severe Pain (7 - 10)  ondansetron Injectable 4 milliGRAM(s) IV Push every 8 hours PRN Nausea and/or Vomiting      
Patient is a 50y old  Male who presents with a chief complaint of Sepsis 2/2 UTI (11 Jul 2025 13:16)    Pt seen and examined at bedside. Febrile this am. AAOx3. Reports feeling better. Denies any chills, dysusria, hematuria, abdo/flank pain, chest pain, or any other active complaints.     ROS negative except for above    Vital Signs Last 24 Hrs  T(C): 37.4 (12 Jul 2025 11:04), Max: 37.8 (11 Jul 2025 11:32)  T(F): 99.3 (12 Jul 2025 11:04), Max: 100 (11 Jul 2025 11:32)  HR: 99 (12 Jul 2025 11:04) (97 - 112)  BP: 109/72 (12 Jul 2025 11:04) (107/67 - 159/74)  BP(mean): --  RR: 17 (12 Jul 2025 11:04) (14 - 18)  SpO2: 98% (12 Jul 2025 11:04) (96% - 98%)    Parameters below as of 12 Jul 2025 11:04  Patient On (Oxygen Delivery Method): room air      GENERAL: NAD, lying in bed comfortably  HEAD:  Atraumatic, normocephalic  EYES: EOMI, PERRL  NECK: Supple, trachea midline, no JVD  HEART: Regular rate and rhythm  LUNGS: Unlabored respirations.  Clear to auscultation bilaterally, no crackles, wheezing, or rhonchi  ABDOMEN: Soft, nontender, nondistended, +BS  EXTREMITIES: 2+ peripheral pulses bilaterally. No clubbing, cyanosis, or edema  NERVOUS SYSTEM:  A&Ox3, moving all extremities, no focal deficits         LABS:  cret                        13.6   14.11 )-----------( 168      ( 12 Jul 2025 09:07 )             40.4     07-12    133[L]  |  100  |  11  ----------------------------<  123[H]  4.0   |  26  |  1.06    Ca    9.2      12 Jul 2025 09:07    TPro  7.6  /  Alb  2.7[L]  /  TBili  1.4[H]  /  DBili  x   /  AST  27  /  ALT  56  /  AlkPhos  55  07-12            < from: CT Abdomen and Pelvis No Cont (07.10.25 @ 18:23) >  IMPRESSION:  No acute findings.    Hepatic steatosis.    < end of copied text >    MEDICATIONS  (STANDING):  cefTRIAXone   IVPB 1000 milliGRAM(s) IV Intermittent every 24 hours  dextrose 5%. 1000 milliLiter(s) (50 mL/Hr) IV Continuous <Continuous>  dextrose 50% Injectable 25 Gram(s) IV Push once  glucagon  Injectable 1 milliGRAM(s) IntraMuscular once  insulin lispro (ADMELOG) corrective regimen sliding scale   SubCutaneous three times a day before meals  lisinopril 20 milliGRAM(s) Oral daily  naloxone Injectable 0.4 milliGRAM(s) IV Push once  polyethylene glycol 3350 17 Gram(s) Oral daily  senna 2 Tablet(s) Oral at bedtime  sodium chloride 0.9%. 1000 milliLiter(s) (75 mL/Hr) IV Continuous <Continuous>    MEDICATIONS  (PRN):  acetaminophen     Tablet .. 650 milliGRAM(s) Oral every 6 hours PRN Temp greater or equal to 38C (100.4F), Mild Pain (1 - 3)  aluminum hydroxide/magnesium hydroxide/simethicone Suspension 30 milliLiter(s) Oral every 4 hours PRN Dyspepsia  bisacodyl 5 milliGRAM(s) Oral daily PRN Constipation  dextrose Oral Gel 15 Gram(s) Oral once PRN Blood Glucose LESS THAN 70 milliGRAM(s)/deciliter  melatonin 3 milliGRAM(s) Oral at bedtime PRN Insomnia  morphine  - Injectable 2 milliGRAM(s) IV Push every 4 hours PRN Moderate Pain (4 - 6)  morphine  - Injectable 4 milliGRAM(s) IV Push every 4 hours PRN Severe Pain (7 - 10)  ondansetron Injectable 4 milliGRAM(s) IV Push every 8 hours PRN Nausea and/or Vomiting    
Patient is a 50y old  Male who presents with a chief complaint of Sepsis 2/2 UTI (11 Jul 2025 13:16)    Pt seen and examined at bedside. Febrile this am. AAOx3. Reports feeling better. Denies any chills, dysusria, hematuria, abdo/flank pain, chest pain, or any other active complaints.     ROS negative except for above    Vital Signs Last 24 Hrs  T(C): 37.8 (11 Jul 2025 11:32), Max: 38.6 (10 Jul 2025 17:15)  T(F): 100 (11 Jul 2025 11:32), Max: 101.4 (10 Jul 2025 17:15)  HR: 105 (11 Jul 2025 11:32) (104 - 112)  BP: 114/78 (11 Jul 2025 11:32) (114/78 - 124/79)  BP(mean): 87 (11 Jul 2025 05:06) (87 - 87)  RR: 14 (11 Jul 2025 11:32) (14 - 18)  SpO2: 97% (11 Jul 2025 11:32) (97% - 100%)    Parameters below as of 11 Jul 2025 11:32  Patient On (Oxygen Delivery Method): room air    GENERAL: NAD, lying in bed comfortably  HEAD:  Atraumatic, normocephalic  EYES: EOMI, PERRL  NECK: Supple, trachea midline, no JVD  HEART: Regular rate and rhythm  LUNGS: Unlabored respirations.  Clear to auscultation bilaterally, no crackles, wheezing, or rhonchi  ABDOMEN: Soft, nontender, nondistended, +BS  EXTREMITIES: 2+ peripheral pulses bilaterally. No clubbing, cyanosis, or edema  NERVOUS SYSTEM:  A&Ox3, moving all extremities, no focal deficits       LABS:  cret                        14.6   14.28 )-----------( 179      ( 11 Jul 2025 05:52 )             44.6     07-11    133[L]  |  103  |  15  ----------------------------<  150[H]  4.1   |  25  |  1.23    Ca    8.5      11 Jul 2025 05:52    TPro  7.9  /  Alb  2.8[L]  /  TBili  1.3[H]  /  DBili  x   /  AST  40[H]  /  ALT  62  /  AlkPhos  55  07-11        < from: CT Abdomen and Pelvis No Cont (07.10.25 @ 18:23) >  IMPRESSION:  No acute findings.    Hepatic steatosis.    < end of copied text >      MEDICATIONS  (STANDING):  cefTRIAXone   IVPB 1000 milliGRAM(s) IV Intermittent every 24 hours  dextrose 5%. 1000 milliLiter(s) (50 mL/Hr) IV Continuous <Continuous>  dextrose 50% Injectable 25 Gram(s) IV Push once  glucagon  Injectable 1 milliGRAM(s) IntraMuscular once  insulin lispro (ADMELOG) corrective regimen sliding scale   SubCutaneous three times a day before meals  lisinopril 20 milliGRAM(s) Oral daily  naloxone Injectable 0.4 milliGRAM(s) IV Push once  polyethylene glycol 3350 17 Gram(s) Oral daily  senna 2 Tablet(s) Oral at bedtime  sodium chloride 0.9%. 1000 milliLiter(s) (75 mL/Hr) IV Continuous <Continuous>

## 2025-07-14 ENCOUNTER — TRANSCRIPTION ENCOUNTER (OUTPATIENT)
Age: 50
End: 2025-07-14

## 2025-07-14 VITALS
OXYGEN SATURATION: 98 % | SYSTOLIC BLOOD PRESSURE: 123 MMHG | HEART RATE: 89 BPM | RESPIRATION RATE: 17 BRPM | DIASTOLIC BLOOD PRESSURE: 79 MMHG

## 2025-07-14 LAB
ANION GAP SERPL CALC-SCNC: 5 MMOL/L — SIGNIFICANT CHANGE UP (ref 5–17)
BUN SERPL-MCNC: 11 MG/DL — SIGNIFICANT CHANGE UP (ref 7–23)
CALCIUM SERPL-MCNC: 9.1 MG/DL — SIGNIFICANT CHANGE UP (ref 8.5–10.1)
CHLORIDE SERPL-SCNC: 100 MMOL/L — SIGNIFICANT CHANGE UP (ref 96–108)
CO2 SERPL-SCNC: 29 MMOL/L — SIGNIFICANT CHANGE UP (ref 22–31)
CREAT SERPL-MCNC: 1.06 MG/DL — SIGNIFICANT CHANGE UP (ref 0.5–1.3)
EGFR: 86 ML/MIN/1.73M2 — SIGNIFICANT CHANGE UP
EGFR: 86 ML/MIN/1.73M2 — SIGNIFICANT CHANGE UP
GLUCOSE BLDC GLUCOMTR-MCNC: 114 MG/DL — HIGH (ref 70–99)
GLUCOSE BLDC GLUCOMTR-MCNC: 114 MG/DL — HIGH (ref 70–99)
GLUCOSE SERPL-MCNC: 109 MG/DL — HIGH (ref 70–99)
HCT VFR BLD CALC: 39.7 % — SIGNIFICANT CHANGE UP (ref 39–50)
HGB BLD-MCNC: 13.2 G/DL — SIGNIFICANT CHANGE UP (ref 13–17)
MCHC RBC-ENTMCNC: 29.2 PG — SIGNIFICANT CHANGE UP (ref 27–34)
MCHC RBC-ENTMCNC: 33.2 G/DL — SIGNIFICANT CHANGE UP (ref 32–36)
MCV RBC AUTO: 87.8 FL — SIGNIFICANT CHANGE UP (ref 80–100)
NRBC BLD AUTO-RTO: 0 /100 WBCS — SIGNIFICANT CHANGE UP (ref 0–0)
PLATELET # BLD AUTO: 197 K/UL — SIGNIFICANT CHANGE UP (ref 150–400)
POTASSIUM SERPL-MCNC: 4 MMOL/L — SIGNIFICANT CHANGE UP (ref 3.5–5.3)
POTASSIUM SERPL-SCNC: 4 MMOL/L — SIGNIFICANT CHANGE UP (ref 3.5–5.3)
RBC # BLD: 4.52 M/UL — SIGNIFICANT CHANGE UP (ref 4.2–5.8)
RBC # FLD: 13.2 % — SIGNIFICANT CHANGE UP (ref 10.3–14.5)
SODIUM SERPL-SCNC: 134 MMOL/L — LOW (ref 135–145)
WBC # BLD: 11.88 K/UL — HIGH (ref 3.8–10.5)
WBC # FLD AUTO: 11.88 K/UL — HIGH (ref 3.8–10.5)

## 2025-07-14 PROCEDURE — 99239 HOSP IP/OBS DSCHRG MGMT >30: CPT

## 2025-07-14 RX ORDER — CEFPODOXIME PROXETIL 200 MG/1
1 TABLET, FILM COATED ORAL
Qty: 14 | Refills: 0
Start: 2025-07-14 | End: 2025-07-20

## 2025-07-14 RX ADMIN — Medication 75 MILLILITER(S): at 12:23

## 2025-07-14 RX ADMIN — LISINOPRIL 20 MILLIGRAM(S): 5 TABLET ORAL at 05:42

## 2025-07-14 NOTE — DISCHARGE NOTE NURSING/CASE MANAGEMENT/SOCIAL WORK - FINANCIAL ASSISTANCE
[facility] provides services at a reduced cost to those who are determined to be eligible through [facility]’s financial assistance program. Information regarding [facility]’s financial assistance program can be found by going to [email or web address] or by calling [phone].

## 2025-07-14 NOTE — DISCHARGE NOTE NURSING/CASE MANAGEMENT/SOCIAL WORK - PATIENT PORTAL LINK FT
You can access the FollowMyHealth Patient Portal offered by [facility] by registering at the following website: [email or web address]. By joining [facility]’s FollowMyHealth portal, you will also be able to view your health information using other applications (apps) compatible with our system.

## 2025-07-14 NOTE — DISCHARGE NOTE PROVIDER - NSDCCPCAREPLAN_GEN_ALL_CORE_FT
PRINCIPAL DISCHARGE DIAGNOSIS  Diagnosis: Acute UTI  Assessment and Plan of Treatment:       SECONDARY DISCHARGE DIAGNOSES  Diagnosis: STEFAN (acute kidney injury)  Assessment and Plan of Treatment:     Diagnosis: DM (diabetes mellitus)  Assessment and Plan of Treatment:     Diagnosis: Sepsis  Assessment and Plan of Treatment:

## 2025-07-14 NOTE — DISCHARGE NOTE PROVIDER - NSDCMRMEDTOKEN_GEN_ALL_CORE_FT
cefpodoxime 200 mg oral tablet: 1 tab(s) orally 2 times a day  lisinopril 20 mg oral tablet: 1 tab(s) orally once a day  metFORMIN:   semaglutide:   
lisinopril 20 mg oral tablet: 1 tab(s) orally once a day  metFORMIN:   semaglutide:

## 2025-07-14 NOTE — DISCHARGE NOTE PROVIDER - HOSPITAL COURSE
50-year-old male with past medical history of diabetes & hypertension presents here today with fever at home, admitted for acute pyelonephritis.     ED course: Patient febrile with Tmax in .4, tachycardic, otherwise hemodynamically stable. Labs remarkable for WBC 18.72, Cr 2.13, T bili 1.7, UA showing cloudy urine/orange/100 protein/trace ketone/small blood/small bilirubin/2.0 urobilinogen/small LE/+ nitrite/8 WBC/5 RBC/few bacteria/+ sq epith. CT chest/abdomen/pelvis shows no acute findings, hepatic steatosis.     #Sepsis 2/2  UTI, possible pyelonephritis  - Patient presents with abdominal pain, flank pain, and fever. SIRS 3/4 for fever, tachycardia, and leukocytosis. UA showing cloudy urine/orange/100 protein/trace ketone/small blood/small bilirubin/2.0 urobilinogen/small LE/+ nitrite/8 WBC/5 RBC/few bacteria/+ sq epith. CT chest/abdomen/pelvis shows no acute findings, hepatic steatosis.   - Continue ceftriaxone, switch to PO vantin upon d/c  - Urine culture NGTD  - Blood culture NGTD  - 7/13: febrile, repeat Bcx pending no further fever. pt instructed to call the hospital for result tomorrow.   - renal u/s reviewed    #STEFAN, resolved   #DM ; cont home meds  HTN: Continue lisinopril

## 2025-07-16 LAB
CULTURE RESULTS: SIGNIFICANT CHANGE UP
CULTURE RESULTS: SIGNIFICANT CHANGE UP
SPECIMEN SOURCE: SIGNIFICANT CHANGE UP
SPECIMEN SOURCE: SIGNIFICANT CHANGE UP

## 2025-07-18 DIAGNOSIS — Z91.013 ALLERGY TO SEAFOOD: ICD-10-CM

## 2025-07-18 DIAGNOSIS — K76.0 FATTY (CHANGE OF) LIVER, NOT ELSEWHERE CLASSIFIED: ICD-10-CM

## 2025-07-18 DIAGNOSIS — A41.9 SEPSIS, UNSPECIFIED ORGANISM: ICD-10-CM

## 2025-07-18 DIAGNOSIS — I10 ESSENTIAL (PRIMARY) HYPERTENSION: ICD-10-CM

## 2025-07-18 DIAGNOSIS — E11.9 TYPE 2 DIABETES MELLITUS WITHOUT COMPLICATIONS: ICD-10-CM

## 2025-07-18 DIAGNOSIS — Z11.52 ENCOUNTER FOR SCREENING FOR COVID-19: ICD-10-CM

## 2025-07-18 DIAGNOSIS — N10 ACUTE PYELONEPHRITIS: ICD-10-CM

## 2025-07-18 DIAGNOSIS — N17.9 ACUTE KIDNEY FAILURE, UNSPECIFIED: ICD-10-CM

## 2025-07-18 DIAGNOSIS — Z87.891 PERSONAL HISTORY OF NICOTINE DEPENDENCE: ICD-10-CM

## 2025-07-18 DIAGNOSIS — Z79.84 LONG TERM (CURRENT) USE OF ORAL HYPOGLYCEMIC DRUGS: ICD-10-CM
